# Patient Record
Sex: FEMALE | Race: OTHER | HISPANIC OR LATINO | Employment: OTHER | URBAN - METROPOLITAN AREA
[De-identification: names, ages, dates, MRNs, and addresses within clinical notes are randomized per-mention and may not be internally consistent; named-entity substitution may affect disease eponyms.]

---

## 2021-08-03 ENCOUNTER — TELEPHONE (OUTPATIENT)
Dept: GERIATRICS | Age: 71
End: 2021-08-03

## 2021-08-03 NOTE — TELEPHONE ENCOUNTER
[place] [address]  [phone]    Telephone Intake: Geriatric Assessment     Referral source: Laura Reaves who is scheduling/relationship to patient: Pastor De Leon, friend  Caller's phone number: [phone]              Is there a Power of  in place/If so, who? No, no    Reason for referral: Patient concerns  regarding memory concerns  What is the goal of visit? memory care     Has the patient been seen by a Neurologist or Geriatrician? No  Has the patient ever been diagnosed with dementia? No      Preferred language? [place] citizen  Highest education level? High school  Does the patient wear glasses? Yes   Does the patient use hearing aids? No     Does the patient and/or caregiver have access for a virtual visit (computer or smart phone, working audio and video)? Yes     Caller was informed:    Please make sure the patient is accompanied by someone who knows them well / a caregiver / family member to participate in this appointment  If a patient plans to attend the assessment alone, please route a message to the assigned provider   Primary number on file will be called to confirm this appointment  Who will accompany the patient? [address] [place] [address] packet mailed out to: [address]: If the patient was recently hospitalized, please route intake to assigned provider for chart review

## 2022-04-15 ENCOUNTER — CONSULT (OUTPATIENT)
Dept: GERIATRICS | Age: 72
End: 2022-04-15
Payer: COMMERCIAL

## 2022-04-15 VITALS
HEART RATE: 74 BPM | SYSTOLIC BLOOD PRESSURE: 120 MMHG | HEIGHT: 61 IN | TEMPERATURE: 97.8 F | OXYGEN SATURATION: 94 % | BODY MASS INDEX: 29.15 KG/M2 | WEIGHT: 154.4 LBS | DIASTOLIC BLOOD PRESSURE: 68 MMHG | RESPIRATION RATE: 16 BRPM

## 2022-04-15 DIAGNOSIS — E78.49 OTHER HYPERLIPIDEMIA: ICD-10-CM

## 2022-04-15 DIAGNOSIS — Z79.899 ENCOUNTER FOR MEDICATION REVIEW: ICD-10-CM

## 2022-04-15 DIAGNOSIS — I10 PRIMARY HYPERTENSION: ICD-10-CM

## 2022-04-15 DIAGNOSIS — F33.0 MILD EPISODE OF RECURRENT MAJOR DEPRESSIVE DISORDER (HCC): ICD-10-CM

## 2022-04-15 DIAGNOSIS — F03.91 DEMENTIA WITH BEHAVIORAL DISTURBANCE, UNSPECIFIED DEMENTIA TYPE (HCC): Primary | ICD-10-CM

## 2022-04-15 DIAGNOSIS — H40.9 GLAUCOMA, UNSPECIFIED GLAUCOMA TYPE, UNSPECIFIED LATERALITY: ICD-10-CM

## 2022-04-15 DIAGNOSIS — Z79.4 TYPE 2 DIABETES MELLITUS WITH DIABETIC POLYNEUROPATHY, WITH LONG-TERM CURRENT USE OF INSULIN (HCC): ICD-10-CM

## 2022-04-15 DIAGNOSIS — E11.42 TYPE 2 DIABETES MELLITUS WITH DIABETIC POLYNEUROPATHY, WITH LONG-TERM CURRENT USE OF INSULIN (HCC): ICD-10-CM

## 2022-04-15 PROBLEM — F03.90 DEMENTIA (HCC): Status: ACTIVE | Noted: 2022-04-15

## 2022-04-15 PROCEDURE — 99205 OFFICE O/P NEW HI 60 MIN: CPT | Performed by: STUDENT IN AN ORGANIZED HEALTH CARE EDUCATION/TRAINING PROGRAM

## 2022-04-15 RX ORDER — FLUOXETINE HYDROCHLORIDE 20 MG/1
20 CAPSULE ORAL DAILY
COMMUNITY
Start: 2022-04-13

## 2022-04-15 RX ORDER — METFORMIN HYDROCHLORIDE 750 MG/1
750 TABLET, EXTENDED RELEASE ORAL
COMMUNITY
Start: 2022-02-28 | End: 2023-02-28

## 2022-04-15 RX ORDER — LATANOPROST 50 UG/ML
SOLUTION/ DROPS OPHTHALMIC
COMMUNITY
Start: 2011-03-13

## 2022-04-15 RX ORDER — ATENOLOL 100 MG/1
100 TABLET ORAL
COMMUNITY
Start: 2011-07-10

## 2022-04-15 RX ORDER — CETIRIZINE 2.4 MG/ML
1 SOLUTION/ DROPS OPHTHALMIC
COMMUNITY
Start: 2022-04-04

## 2022-04-15 RX ORDER — ESCITALOPRAM OXALATE 10 MG/1
TABLET ORAL
COMMUNITY
Start: 2011-05-26

## 2022-04-15 RX ORDER — LATANOPROST 50 UG/ML
SOLUTION/ DROPS OPHTHALMIC
COMMUNITY
Start: 2011-07-24

## 2022-04-15 RX ORDER — HYDROXYZINE HYDROCHLORIDE 25 MG/1
25 TABLET, FILM COATED ORAL 3 TIMES DAILY
COMMUNITY
Start: 2022-04-13

## 2022-04-15 RX ORDER — SOLIFENACIN SUCCINATE 10 MG/1
TABLET, FILM COATED ORAL
COMMUNITY
Start: 2011-06-09

## 2022-04-15 RX ORDER — OLOPATADINE HYDROCHLORIDE 1 MG/ML
1 SOLUTION/ DROPS OPHTHALMIC 2 TIMES DAILY
COMMUNITY
Start: 2022-04-13 | End: 2023-04-13

## 2022-04-15 RX ORDER — ERGOCALCIFEROL (VITAMIN D2) 1250 MCG
1 CAPSULE ORAL WEEKLY
COMMUNITY
Start: 2022-04-13

## 2022-04-15 RX ORDER — POLYETHYLENE GLYCOL 400 AND PROPYLENE GLYCOL 4; 3 MG/ML; MG/ML
1 SOLUTION/ DROPS OPHTHALMIC 3 TIMES DAILY
COMMUNITY
Start: 2021-07-20 | End: 2022-07-20

## 2022-04-15 RX ORDER — SIMVASTATIN 10 MG
10 TABLET ORAL
COMMUNITY
Start: 2022-04-13

## 2022-04-15 RX ORDER — LISINOPRIL AND HYDROCHLOROTHIAZIDE 20; 12.5 MG/1; MG/1
1 TABLET ORAL DAILY
COMMUNITY
Start: 2022-04-13 | End: 2023-04-13

## 2022-04-15 RX ORDER — INSULIN GLARGINE 100 [IU]/ML
INJECTION, SOLUTION SUBCUTANEOUS
COMMUNITY
Start: 2022-02-28

## 2022-04-15 RX ORDER — AMMONIUM LACTATE 12 G/100G
1 CREAM TOPICAL
COMMUNITY
Start: 2021-12-06

## 2022-04-15 RX ORDER — MONTELUKAST SODIUM 10 MG/1
10 TABLET ORAL DAILY
COMMUNITY
Start: 2022-04-13

## 2022-04-15 RX ORDER — GLIMEPIRIDE 1 MG/1
1 TABLET ORAL DAILY
COMMUNITY
Start: 2022-04-13 | End: 2023-04-13

## 2022-04-15 RX ORDER — MIRABEGRON 50 MG/1
50 TABLET, FILM COATED, EXTENDED RELEASE ORAL DAILY
COMMUNITY
Start: 2022-04-14 | End: 2023-04-14

## 2022-04-15 RX ORDER — SAW/PYGEUM/BETA/HERB/D3/B6/ZN 30 MG-25MG
10 CAPSULE ORAL
COMMUNITY
Start: 2022-04-13 | End: 2023-04-13

## 2022-04-15 RX ORDER — SITAGLIPTIN AND METFORMIN HYDROCHLORIDE 1000; 50 MG/1; MG/1
TABLET, FILM COATED ORAL
COMMUNITY
Start: 2011-07-13

## 2022-04-15 RX ORDER — GABAPENTIN 300 MG/1
300 CAPSULE ORAL
COMMUNITY
Start: 2022-04-13 | End: 2023-04-13

## 2022-04-15 RX ORDER — MAGNESIUM OXIDE 400 MG/1
400 TABLET ORAL DAILY
COMMUNITY
Start: 2022-04-13 | End: 2023-04-13

## 2022-04-15 RX ORDER — AZELASTINE 1 MG/ML
1-2 SPRAY, METERED NASAL 2 TIMES DAILY
COMMUNITY
Start: 2021-12-06 | End: 2022-12-06

## 2022-04-15 NOTE — PROGRESS NOTES
ASSESSMENT AND PLAN:  1  Dementia with behavioral disturbance, unspecified dementia type ([facility] [address] )  Assessment & Plan:  MOCA 15/30, GDS 2, TUGT 11 sec  Patient significant deficits in visual spatial, executive function, attention, language, abstraction, delayed recall and orientation domains  Will order TSH, B12, folate, RPR  Will order MRI neuro quant of the brain  [facility] 2020:  No gross abnormality  Given history, physical exam and above neurocognitive screening, this places the patient a level mild dementia  Etiology likely multifactorial:  Vascular risk factors vs neuro degenerative process vs mood disorder  Will refer for cognitive therapy  Consider blister packaging for ease of medication administration  Patient maintained on Prozac and Lexapro by PCP  Would consider referral to Psychiatry given notable behaviors with dementia  Will discuss pharmacotherapy options at care plan conference as well as review of current medications as patient is maintained on hydroxyzine, solifenacin, gabapentin  Patient currently has 24/7 supervised care  Consider a falls Alert device as a safety precaution  Would encourage enrolling into a senior  program for positive socialization, physical and cognitive stimulation  Goal per family is to keep the patient at home  Consider cognitively stimulating apps  Consider the memory cafe  Recommend the Mediterranean diet shown to decrease risk of memory loss and CVA  Reorientation redirection as needed  Manage chronic conditions  Maintain Falls precautions  Encourage patient to remain active mentally, physically and socially  Participate in cognitively challenging exercises as able    Orders:  -     Ambulatory Referral to Psychiatry; Future  -     TSH, 3rd generation with Free T4 reflex; Future  -     Vitamin B12; Future  -     Folate; Future  -     RPR; Future; Expected date: 04/16/2022  -     MRI brain NeuroQuant wo contrast; Future; Expected date: 04/15/2022    2   Type 2 diabetes mellitus with diabetic polyneuropathy, with long-term current use of insulin (HCC)  Assessment & Plan:    Lab Results   Component Value Date    HGBA1C 6 6 (H) 02/21/2022   Patient continues to with endocrinology maintained on glimepiride, Januvia, Jardiance and Lantus  Recommend adherence to a diabetic, heart healthy diet  Exercise as able        3  Other hyperlipidemia  Assessment & Plan:  Patient maintained simvastatin 10 mg daily  Recommend a diabetic, heart healthy diet  Follow-up with PCP for continued monitoring      4  Glaucoma, unspecified glaucoma type, unspecified laterality  Assessment & Plan:  Follow-up with Ophthalmology  Continue latanoprost ophthalmic drops  Maintain Falls precautions      5  Primary hypertension  Assessment & Plan:  /68  Controlled  Patient continues on Prinzide Zestoretic, atenolol  Follow-up with PCP for continued monitoring  Recommend a diabetic, heart healthy diet      6  Mild episode of recurrent major depressive disorder [facility])  Assessment & Plan:  GDS 2  Patient with history chronic depression  Currently maintained on Prozac, Lexapro  Continue social support by family and friends  No HI/SI  Would recommend referral to Psychiatry due to notable behaviors with dementia      7   Encounter for medication review  Assessment & Plan:  Per family, patient currently maintained on solifenacin which may contribute to confusion, hallucinations and somnolence in older patients  Patient also maintained on gabapentin which may have the side effect of abnormal thinking, amnesia and dysarthria in older patients  Patient also chronically on Atarax which may contribute to ataxia, weakness, slurred speech in elderly patients  Discuss further at care plan conference        Decision-making capacity:  The patient should not make any medical or financial decisions independently without the presence of her POA as assessed during this visit    Staging:  Mild dementia, FAST stage 4    Medications Review:  Reviewed chronic use of gabapentin, atarax and solifenacin in older patients      HPI:    We had the pleasure of evaluating Vashti Desai who is a 66 y o  female with type 2 diabetes, HTN, HLD, CKD 3, polyneuropathy, depression, right adrenal nodule and cognitive impairment amongst other medical conditions in Geriatric consultation today  Ms Corinne Rhein is in the office with her nephew and her sister is present via the phone  The patient currently resides in a senior living apartment independently however she does have 24/7 supervised care with home health aides  Her nephew is one of her home health aides and typically spends most of the daytime period with her  Per patient's sister and nephew, the patient is independent in all ADLs but dependent in IADLs  [place] and completed middle school  She has not worked since coming to [place]  She does have 3 daughters, 2 reside in [address] [place] [address] and 1 in [place]  Today family explained that the patient has had progressively worsening memory loss over the past 5 years  Family feels short-term memory has been more affected than long-term memory  The patient continues to cook at home with supervision from her aides as she has often left the stove on and burnt food  Nephew who is 1 of her aides states that she continues to be forgetful about turning the stove off as a result of which she usually is supervised during the cooking process  Sister explains that she typically pays for the patient's apartment which cause $50  per month and family will supervise administration of her medications  The patient's mood is somewhat labile as her nephew explains she has become very short-tempered and argumentative at times  She does have a chronic history of depression for which she has been managed on Lexapro as well as Prozac    No aggressive behaviors however she does have intermittent agitation in the evenings if she becomes upset  The patient remains very sedentary at home and looks at television for most of the day  She does not enjoy participating in any cognitive or physical activities despite encouragement from her aides  No cardiorespiratory distress, fever, chills, URI or urinary symptoms  The patient continues to tolerate oral intake, has been sleeping on her current regimen of hydroxyzine and has been having routine bowel movements  During the office visit, the patient was very repetitive in required frequent reorientation redirection  She was pleasant and cooperative however and requested further workup as she continues to follow with multiple specialties for her chronic conditions  COGNITION:  Memory Issues noticed since 5 years  Memory affected: short and long term memory loss    Symptoms started: gradual  Over time the memory has:  worsened  Memory issue(s) were noted by: family   Patient has difficulties with medication errors and cooking  Difficulty finding the right word while speaking: Yes  Requires repeat information or asking the same question repeatedly: Yes  Fluctuation in alertness: No  Changes in mood or personality:No  Current or previous treatment for depression or anxiety: Yes    Family member with dementia and what type? yes  History of head trauma: unknown  History of stroke: No  History of alcohol or substance abuse: No    FUNCTIONAL STATUS:  BADLs  Does patient require assistance with:  Bathing: No  Dressing: No  Toileting: No  Transferring: No  Continence: No  Feeding: No    IADLs  Dose patient require assistance with:  Telephone: Yes  Shopping: Yes  Food Preparation: Yes  Housekeeping: Yes  Laundry: Yes  Transportation: Yes  Medications: Yes  Finances: Yes    NEUROPSYCH SYMPTOMS:  Does patient get angry or hostile? Resist care from others? Yes  Does patient see or hear things that no one else can see or hear? No  Does patient act impatient and cranky?  Does mood frequently change for no apparent reason? Yes  Does patient act suspicious without good reason (example: believes that others are stealing from him or her, or planning to harm him or her in some way)? Yes  Does patient less interested in his or her usual activities or in the activities and plans of others? Yes  Does patient have trouble sleeping at night? No  Dose patient have abnormal movements while asleep? No    SAFETY:  Hearing and vision issue: No  Any gait or balance disorder: Yes  Uses: none  Any falls in the last year: Yes  Any history of wandering: No  Are there firearms or guns in the home: No  Does patient drive: No  Any driving accidents or citations in the last year: No  Any concerns about patient's ability to drive: No    ACP REVIEW:  Does patient have POA: Yes  Does patient have a Living will: Yes  Any legal assistance needed for healthcare planning?: No    ROS: Review of Systems   Constitutional: Negative for chills and fever  HENT: Negative for congestion, ear pain, rhinorrhea and sore throat  Eyes: Negative for discharge  Respiratory: Negative for cough, chest tightness, shortness of breath, wheezing and stridor  Cardiovascular: Negative for chest pain, palpitations and leg swelling  Gastrointestinal: Negative for abdominal pain, constipation, diarrhea, nausea and vomiting  Genitourinary: Positive for frequency (Chronic) and urgency (Chronic)  Negative for dysuria  Musculoskeletal: Positive for arthralgias  Negative for back pain and gait problem  Skin: Negative for color change, pallor, rash and wound  Neurological: Negative for dizziness, speech difficulty and light-headedness  Psychiatric/Behavioral: Positive for confusion (intermittent) and decreased concentration  Negative for dysphoric mood and sleep disturbance         Allergies   Allergen Reactions    Aspirin Other (See Comments) and Shortness Of Breath     eye, throat problems    Diphenhydramine Shortness Of Breath    Shellfish Allergy - Food Allergy Shortness Of Breath    Ibuprofen Other (See Comments)     swelling, itchy eyes       Medications:    Current Outpatient Medications on File Prior to Visit   Medication Sig Dispense Refill    ammonium lactate (LAC-HYDRIN) 12 % cream Apply 1 application topically      atenolol (TENORMIN) 100 mg tablet Take 100 mg by mouth      azelastine (ASTELIN) 0 1 % nasal spray 1-2 sprays into each nostril 2 (two) times a day      Cetirizine HCl (Zerviate) 0 24 % SOLN Apply 1 drop to eye      cyanocobalamin (VITAMIN B-12) 1000 MCG tablet Take 1,000 mcg by mouth daily      ergocalciferol (ERGOCALCIFEROL) 1 25 MG (41614 UT) capsule Take 1 capsule by mouth once a week      escitalopram (Lexapro) 10 mg tablet Take by mouth      FLUoxetine (PROzac) 20 mg capsule Take 20 mg by mouth daily      gabapentin (NEURONTIN) 300 mg capsule Take 300 mg by mouth      glimepiride (AMARYL) 1 mg tablet Take 1 mg by mouth daily      glucose blood test strip Use Three times a day      hydrOXYzine HCL (ATARAX) 25 mg tablet Take 25 mg by mouth Three times a day      insulin glargine (Lantus SoloStar) 100 units/mL injection pen Inject 18u sc daily      Insulin Pen Needle 32G X 6 MM MISC Use qith solostar pen daily      latanoprost (XALATAN) 0 005 % ophthalmic solution Apply to eye      latanoprost (Xalatan) 0 005 % ophthalmic solution Apply to eye      lisinopril-hydrochlorothiazide (PRINZIDE,ZESTORETIC) 20-12 5 MG per tablet Take 1 tablet by mouth daily      magnesium oxide (MAG-OX) 400 mg tablet Take 400 mg by mouth daily      Melatonin ER 10 MG TBCR Take 10 mg by mouth      metFORMIN (GLUCOPHAGE-XR) 750 mg 24 hr tablet Take 750 mg by mouth      Mirabegron ER (Myrbetriq) 50 MG TB24 Take 50 mg by mouth daily      montelukast (SINGULAIR) 10 mg tablet Take 10 mg by mouth daily      olopatadine (PATANOL) 0 1 % ophthalmic solution Apply 1 drop to eye 2 (two) times a day      polyethylene glycol-propylene glycol (Systane) 0 4-0 3 % Apply 1 drop to eye Three times a day      simvastatin (ZOCOR) 10 mg tablet Take 10 mg by mouth      sitaGLIPtin-metFORMIN (Janumet)  MG per tablet Take by mouth      solifenacin (VESIcare) 10 MG tablet Take by mouth       No current facility-administered medications on file prior to visit  History:  History reviewed  No pertinent past medical history  History reviewed  No pertinent surgical history  History reviewed  No pertinent family history  Social History     Socioeconomic History    Marital status:      Spouse name: Not on file    Number of children: Not on file    Years of education: Not on file    Highest education level: Not on file   Occupational History    Not on file   Tobacco Use    Smoking status: Not on file    Smokeless tobacco: Not on file   Substance and Sexual Activity    Alcohol use: Not on file    Drug use: Not on file    Sexual activity: Not on file   Other Topics Concern    Not on file   Social History Narrative    Not on file     Social Determinants of Health     Financial Resource Strain: Not on file   Food Insecurity: Not on file   Transportation Needs: Not on file   Physical Activity: Not on file   Stress: Not on file   Social Connections: Not on file   Intimate Partner Violence: Not on file   Housing Stability: Not on file     History reviewed  No pertinent surgical history  OBJECTIVE:  Vitals:    04/15/22 1548   BP: 120/68   BP Location: Left arm   Patient Position: Sitting   Cuff Size: Adult   Pulse: 74   Resp: 16   Temp: 97 8 °F (36 6 °C)   TempSrc: Temporal   SpO2: 94%   Weight: 70 kg (154 lb 6 4 oz)   Height: 5' 0 5" (1 537 m)     Body mass index is 29 66 kg/m²  Physical Exam  Vitals reviewed  Constitutional:       General: She is not in acute distress  Appearance: Normal appearance  She is well-developed  She is not ill-appearing or diaphoretic     HENT:      Head: Normocephalic and atraumatic  Right Ear: Tympanic membrane, ear canal and external ear normal       Left Ear: Tympanic membrane, ear canal and external ear normal       Nose: Nose normal       Mouth/Throat:      Mouth: Mucous membranes are moist       Pharynx: No oropharyngeal exudate  Eyes:      General: No scleral icterus  Right eye: No discharge  Left eye: No discharge  Conjunctiva/sclera: Conjunctivae normal    Neck:      Vascular: No JVD  Cardiovascular:      Rate and Rhythm: Normal rate and regular rhythm  Heart sounds: Murmur heard  No friction rub  No gallop  Pulmonary:      Effort: Pulmonary effort is normal  No respiratory distress  Breath sounds: Normal breath sounds  No wheezing or rales  Abdominal:      General: Bowel sounds are normal  There is no distension  Palpations: Abdomen is soft  Tenderness: There is no abdominal tenderness  There is no guarding  Musculoskeletal:         General: No tenderness or deformity  Normal range of motion  Cervical back: Normal range of motion and neck supple  Right lower leg: No edema  Left lower leg: No edema  Skin:     General: Skin is warm  Coloration: Skin is not pale  Findings: No erythema or rash  Neurological:      General: No focal deficit present  Mental Status: She is alert and oriented to person, place, and time  Mental status is at baseline  Cranial Nerves: No cranial nerve deficit  Gait: Gait normal       Deep Tendon Reflexes: Reflexes are normal and symmetric     Psychiatric:         Mood and Affect: Mood normal          MoCA: 15/30      Labs & Imaging:  Lab Results   Component Value Date    WBC 9 10 04/19/2014    HGB 11 5 04/19/2014    HCT 34 5 (L) 04/19/2014    MCV 95 04/19/2014     04/19/2014     Lab Results   Component Value Date    K 4 4 04/19/2014     (L) 04/19/2014    CO2 27 04/19/2014    BUN 20 04/19/2014    CREATININE 0 85 04/19/2014    CALCIUM 9 4 04/19/2014     No results found for: [id number]  No results found for: [id number], TSH  No results found for: [id number], [id number]   No results found for this or any previous visit

## 2022-04-17 PROBLEM — Z79.899 ENCOUNTER FOR MEDICATION REVIEW: Status: ACTIVE | Noted: 2022-04-17

## 2022-04-17 PROBLEM — Z79.4 TYPE 2 DIABETES MELLITUS, WITH LONG-TERM CURRENT USE OF INSULIN (HCC): Status: ACTIVE | Noted: 2022-04-17

## 2022-04-17 PROBLEM — H40.9 GLAUCOMA: Status: ACTIVE | Noted: 2022-04-17

## 2022-04-17 PROBLEM — E11.9 TYPE 2 DIABETES MELLITUS, WITH LONG-TERM CURRENT USE OF INSULIN (HCC): Status: ACTIVE | Noted: 2022-04-17

## 2022-04-17 PROBLEM — F33.0 MILD EPISODE OF RECURRENT MAJOR DEPRESSIVE DISORDER (HCC): Status: ACTIVE | Noted: 2022-04-17

## 2022-04-17 PROBLEM — E78.49 OTHER HYPERLIPIDEMIA: Status: ACTIVE | Noted: 2022-04-17

## 2022-04-17 PROBLEM — I10 PRIMARY HYPERTENSION: Status: ACTIVE | Noted: 2022-04-17

## 2022-04-17 NOTE — ASSESSMENT & PLAN NOTE
MOCA 15/30, GDS 2, TUGT 11 sec  Patient significant deficits in visual spatial, executive function, attention, language, abstraction, delayed recall and orientation domains  Will order TSH, B12, folate, RPR  Will order MRI neuro quant of the brain  [facility] 2020:  No gross abnormality  Given history, physical exam and above neurocognitive screening, this places the patient a level mild dementia  Etiology likely multifactorial:  Vascular risk factors vs neuro degenerative process vs mood disorder  Will refer for cognitive therapy  Consider blister packaging for ease of medication administration  Patient maintained on Prozac and Lexapro by PCP  Would consider referral to Psychiatry given notable behaviors with dementia  Will discuss pharmacotherapy options at care plan conference as well as review of current medications as patient is maintained on hydroxyzine, solifenacin, gabapentin  Patient currently has 24/7 supervised care  Consider a falls Alert device as a safety precaution  Would encourage enrolling into a senior  program for positive socialization, physical and cognitive stimulation  Goal per family is to keep the patient at home  Consider cognitively stimulating apps  Consider the memory cafe  Recommend the Mediterranean diet shown to decrease risk of memory loss and CVA  Reorientation redirection as needed  Manage chronic conditions  Maintain Falls precautions  Encourage patient to remain active mentally, physically and socially  Participate in cognitively challenging exercises as able

## 2022-04-17 NOTE — ASSESSMENT & PLAN NOTE
Per family, patient currently maintained on solifenacin which may contribute to confusion, hallucinations and somnolence in older patients  Patient also maintained on gabapentin which may have the side effect of abnormal thinking, amnesia and dysarthria in older patients  Patient also chronically on Atarax which may contribute to ataxia, weakness, slurred speech in elderly patients  Discuss further at care plan conference

## 2022-04-17 NOTE — ASSESSMENT & PLAN NOTE
Lab Results   Component Value Date    HGBA1C 6 6 (H) 02/21/2022   Patient continues to with endocrinology maintained on glimepiride, Januvia, Jardiance and Lantus  Recommend adherence to a diabetic, heart healthy diet  Exercise as able

## 2022-04-17 NOTE — ASSESSMENT & PLAN NOTE
Patient maintained simvastatin 10 mg daily  Recommend a diabetic, heart healthy diet  Follow-up with PCP for continued monitoring

## 2022-04-17 NOTE — ASSESSMENT & PLAN NOTE
/68  Controlled  Patient continues on Prinzide Zestoretic, atenolol  Follow-up with PCP for continued monitoring  Recommend a diabetic, heart healthy diet

## 2022-04-21 ENCOUNTER — TELEPHONE (OUTPATIENT)
Dept: GERIATRICS | Age: 72
End: 2022-04-21

## 2022-04-21 NOTE — TELEPHONE ENCOUNTER
Called pt daughter and left message to give our office a call back in regards to bloodwork that needs to be completed in order for the pt MRI to get approved  Quality 137: Melanoma: Continuity Of Care - Recall System: Patient information entered into a recall system that includes: target date for the next exam specified AND a process to follow up with patients regarding missed or unscheduled appointments Quality 226: Preventive Care And Screening: Tobacco Use: Screening And Cessation Intervention: Patient screened for tobacco use and is an ex/non-smoker Quality 128: Preventive Care And Screening: Body Mass Index (Bmi) Screening And Follow-Up Plan: BMI is documented within normal parameters and no follow-up plan is required. Detail Level: Detailed Quality 110: Preventive Care And Screening: Influenza Immunization: Influenza Immunization previously received during influenza season

## 2022-04-21 NOTE — TELEPHONE ENCOUNTER
Called pt vaughn in regards to having the pt complete blood work as I need the results to send over to RAD MD in order for Authorization to be completed for upcoming MRI

## 2022-04-28 NOTE — TELEPHONE ENCOUNTER
Called patient's friend, Phillipmolina Caalex [phone], As listed on patient's intake form and identified as patient contact who scheduled geriatric assessment for patient  Relayed patient needs labs to be completed and relayed the imperative nature of having blood work completed asap  Noe Martínez expressed understanding and will make certain that patient has this completed asap

## 2022-05-02 NOTE — TELEPHONE ENCOUNTER
Called pt's nephew Na Lyle and relayed that pt has an MRI on Sunday 5/8 and prior Sage Victor is required  Told nephew to please have the pt get the bloodwork done as soon as possible and to let us know when the patient has completed

## 2022-05-08 ENCOUNTER — HOSPITAL ENCOUNTER (OUTPATIENT)
Dept: MRI IMAGING | Facility: HOSPITAL | Age: 72
Discharge: HOME | End: 2022-05-08
Attending: STUDENT IN AN ORGANIZED HEALTH CARE EDUCATION/TRAINING PROGRAM
Payer: COMMERCIAL

## 2022-05-08 DIAGNOSIS — F03.91 DEMENTIA WITH BEHAVIORAL DISTURBANCE, UNSPECIFIED DEMENTIA TYPE (HCC): ICD-10-CM

## 2022-05-08 PROCEDURE — 70551 MRI BRAIN STEM W/O DYE: CPT

## 2022-05-08 PROCEDURE — G1004 CDSM NDSC: HCPCS

## 2022-05-18 NOTE — PROGRESS NOTES
[facility]  [address] [facility], [address] [facility] [address] [facility], [address] [facility]  [phone]    Care Conference    NAME: Elissa Cotton  AGE: 66 y o  SEX: female  YOB: 1950  DATE OF ASSESSMENT: 04/15/22  DATE OF CONFERENCE: 05/20/22    Family Present: Maryjo Encinas (daughter)  Staff Present: Dr Brandi Pritchard, JAM Vann    Patient / Family Goals of Care: Review cognitive decline and associated symptoms, discuss treatment options and care planning  Medical Concerns (Current/Historical):  Type 2 diabetes mellitus with diabetic polyneuropathy, with long-term current use of insulin, Other hyperlipidemia, primary hypertension, mild episode of recurrent major depressive disorder, encounter for medication review     Geriatric Syndromes/Age Related Syndromes: Dementia with behavioral disturbance, unspecified dementia type, Glaucoma, unspecified glaucoma type, unspecified laterality    Neuropsychological   Dementia with behavioral disturbance   Patient significant deficits in visual spatial, executive function, attention, language, abstraction, delayed recall an orientation domains   Given history, physical exam and above neurocognitive screening, this places the patient a level of mild- moderate dementia, etiology likely mixed: Vascular risk factors  vs  Mood disorder   Decision-making capacity: The patient should not make any medical or financial decisions independently without the presence of her POA as assessed during this visit   Staging: Mild-Moderate dementia, FAST stage 4  o Rodney Cognitive Assessment: 15/30  o Geriatric Depression Screen: 2/15     Remain active physically, mentally and socially   Referral for Cognitive Therapy at [facility] with [place] therapist  Michael Martinez and non-pharmaceutical interventions discussed, will defer at this time due to side effect profile   Recommend Mediterranean diet, shown to decrease risk of memory loss and CVA   Engage in cognitively challenging exercises such as crosswords and puzzles   Consider use of apps such as [employer]  com, [employer], [employer] for cognitively stimulating online games   Maintain chronic conditions under control   The patient does meet criteria for placement into a higher level of care  Per family, goal is to remain at home with increased supervision already in place   MSW to provide resources if they wish   Repeat cognitive assessment as needed    Diagnostic Studies   Review of bloodwork: TSH, B12, Folate, RPR   Review of MRI NeuroQuant (22) IMPRESSION: White matter changes suggestive of mild, chronic micoangiopathy  No acute infarction, intracranial hemorrhage or mass effect  NeuroQuant analysis was performed: Normal study; does not support neurodegeneration   Previous imagin [facility] 2020: No gross abnormality        Physical Finding Impacting Function   Timed Up and Go Test: 11 seconds (Fall risk assessment: low)   Activities of Daily Living: Independent   Instrumental Activities of Daily Living: Dependent     Encourage appropriate footwear at all times   Review fall risk prevention tips and adjust within the home environment as needed    Medications Reviewed    Will discuss pharmacotherapy options & review current medications-chronic use of gabapentin, atarax and solifenacin in older patients   Check with PCP before using over the counter medications   Avoid over the counter medications that can affect cognition (e g , Benadryl, Tylenol PM)    Avoid NSAIDs due to risk of GI bleed and renal impairment    Other Findings   Overall health   BMI: 29 66 kg/m2   Recommend adherence to diabetic, heart healthy diet   Continue following with primary care physician regularly  Type 2 diabetes mellitus with diabetic polyneuropathy, with long-term current use of insulin   HGBA1C 6 6 (H) 22   Continue following with endocrinology   Maintained on glimepiride, Januvia, Jardiance, and Lantus   Exercise as able  Other hyperlipidemia   Patient maintained on simvastatin 10 mg daily   Follow-up with PCP for continued monitoring  Glaucoma, unspecified glaucoma type, unspecified laterality   Follow-up with Ophthalmology   Continue latanoprost ophthalmic drops   Maintain Fall Precautions  Primary hypertension   Controlled   Patient continues on Prinzide Zestoretic, atenolol   Follow-up with PCP for continued monitoring  Mild episode of recurrent major depressive disorder   GDS 2   Patient with a history of chronic depression   Currently maintained on Prozac, Lexapro   Continue social support by family and friends   Referred to Psychiatry due to notable behaviors with dementia  Encounter for medication review   Per family, patient currently maintained on solifenacin   Educated family this may contribute to confusion, hallucinations and somnolence in older patients   Patient also maintained on gabapentin which may have a side effect of abnormal thinking, amnesia, and dysarthria in older patients   Patient also chronically on atarax which may contribute to ataxia, weakness, slurred speech in elderly patients      Recommended Health Maintenance   Immunizations, if not contraindicated:    Influenza vaccine yearly   Pneumo vaccine every 5 years (72 years and over)   Shingles vaccine   COVID-19 vaccine    Social / Safety Concerns   Consider an Adult Day Program for positive socialization, physical exercise, cognitive stimulation and family respite   Consider attending the Memory Cafe   Stay in touch with family and friends   Plan self-care activities for your mental well-being each week   Consider volunteering through [employer] ([phone]) or Volunteer [employer]   Recommend review of fall risk prevention tips   Recommend use of fall precautions including fall alert device   Consider assistance for medication administration such as blister packaging or use of an automated pill dispenser   Recommend contacting your local [address] [facility] on Aging for possible eligible programs such as OPTIONS, Caregiver Support Program, or [facility] Maintain updated advance directives and provide a copy to your primary care provider   Consider caregiver support groups and educational resources through the Alzheimer's Association; access Alzheimer's Association 24/7 Helpline at 5-850.415.7030  ? [address] does offer a monthly caregiver support group  If interested, please speak with a    Utilize reorientation and redirection as needed (dependent on situation)   Educational information provided    Patient and family verbalized understanding of above care plan  For care coordination purposes, this care plan will be shared with your primary care provider  With any questions, please contact our office at [phone]

## 2022-05-20 ENCOUNTER — TELEPHONE (OUTPATIENT)
Dept: PSYCHIATRY | Facility: CLINIC | Age: 72
End: 2022-05-20

## 2022-05-20 ENCOUNTER — TELEMEDICINE (OUTPATIENT)
Dept: GERIATRICS | Age: 72
End: 2022-05-20
Payer: COMMERCIAL

## 2022-05-20 DIAGNOSIS — I10 PRIMARY HYPERTENSION: ICD-10-CM

## 2022-05-20 DIAGNOSIS — F03.91 DEMENTIA WITH BEHAVIORAL DISTURBANCE, UNSPECIFIED DEMENTIA TYPE (HCC): Primary | ICD-10-CM

## 2022-05-20 DIAGNOSIS — F33.0 MILD EPISODE OF RECURRENT MAJOR DEPRESSIVE DISORDER (HCC): ICD-10-CM

## 2022-05-20 DIAGNOSIS — E11.42 TYPE 2 DIABETES MELLITUS WITH DIABETIC POLYNEUROPATHY, WITH LONG-TERM CURRENT USE OF INSULIN (HCC): ICD-10-CM

## 2022-05-20 DIAGNOSIS — Z79.4 TYPE 2 DIABETES MELLITUS WITH DIABETIC POLYNEUROPATHY, WITH LONG-TERM CURRENT USE OF INSULIN (HCC): ICD-10-CM

## 2022-05-20 DIAGNOSIS — E78.49 OTHER HYPERLIPIDEMIA: ICD-10-CM

## 2022-05-20 PROCEDURE — 99483 ASSMT & CARE PLN PT COG IMP: CPT | Performed by: STUDENT IN AN ORGANIZED HEALTH CARE EDUCATION/TRAINING PROGRAM

## 2022-05-20 NOTE — TELEPHONE ENCOUNTER
Pt called back  Pt does not recall being referred to [facility]iatric [facility]  Pt also stated that she already has  Therapist/ med mgmt services  Referral did state dementia

## 2022-05-20 NOTE — PROGRESS NOTES
Virtual Regular Visit    Verification of patient location:    Patient is located in the following state in which I hold an active license [place]      Assessment/Plan:    Problem List Items Addressed This Visit        Endocrine    Type 2 diabetes mellitus, with long-term current use of insulin ([facility] )       Cardiovascular and Mediastinum    Primary hypertension       Nervous and Auditory    Dementia ([facility] ) - Primary    Relevant Orders    Ambulatory Referral to Speech Therapy       Other    Other hyperlipidemia    Mild episode of recurrent major depressive disorder ([facility] )        See care plan note for assessment and plan       Reason for visit is   Chief Complaint   Patient presents with   [address]    Virtual Regular Visit        Encounter provider Juwan Parsons MD    Provider located at [address] [place] [address] [address] [place] [address]  [phone]      Recent Visits  Date Type Provider Dept   05/20/22 Cain Fleischer, MD [address] [facility] recent visits within past 7 days and meeting all other requirements  Future Appointments  No visits were found meeting these conditions  Showing future appointments within next 150 days and meeting all other requirements       The patient was identified by name and date of birth  Dayana Harp was informed that this is a telemedicine visit and that the visit is being conducted through [employer] and patient was informed that this is a secure, HIPAA-compliant platform  She agrees to proceed     My office door was closed  No one else was in the room  She acknowledged consent and understanding of privacy and security of the video platform  The patient has agreed to participate and understands they can discontinue the visit at any time  Patient is aware this is a billable service       Subjective  Dayana Harp is a 66 y o  female who presents for her care plan conference Juan Luis Canas HPI     Patient presents for her care plan conference after recent comprehensive geriatric assessment  She is accompanied by her daughter Mer Mayo  Since prior geriatric intake, no acute changes in cognition, mood, personality or behaviors  Nor  Have there been any safety concerns at home  Per daughter, the patient continues to tolerate oral intake, has been sleeping well and having regular bowel movements  No cardiorespiratory distress, fever, chills, URI, urinary symptoms or recent falls  Of note patient's daughter who was present during the visit today seemed unaware of the history obtained at intake  Attempted to reach patient's sister Erlinda Chavira for further clarification however she was not available per Mer Mayo        Decision-making capacity:  The patient should not make any medical or financial decisions independently without the presence of her POA as assessed during this visit  Staging:  Mild- Moderate dementia, FAST stage 4    Medications Review:  Previously discussed in detail chronic use of gabapentin, Atarax and solifenacin in all the patient's and counseled on side effects  COGNITION:  Memory Issues noticed since 5 years    Memory affected: short term memory loss and long term memory loss    Symptoms started: gradual  Over time the memory has:  worsened  Memory issue(s) were noted by: family   Patient has difficulties with medication errors and cooking  Difficulty finding the right word while speaking: Yes  Requires repeat information or asking the same question repeatedly: Yes  Fluctuation in alertness: No  Changes in mood or personality:No  Current or previous treatment for depression or anxiety: Yes    Family member with dementia and what type?  yes  History of head trauma: unknown  History of stroke: No  History of alcohol or substance abuse: No    FUNCTIONAL STATUS:  BADLs  Does patient require assistance with:  Bathing: No  Dressing: No  Toileting: No  Transferring: No  Continence: No  Feeding: No    IADLs  Dose patient require assistance with:  Telephone: Yes  Shopping: Yes  Food Preparation: Yes  Housekeeping: Yes  Laundry: Yes  Transportation: Yes  Medications: Yes  Finances: Yes    NEUROPSYCH SYMPTOMS:  Does patient get angry or hostile? Resist care from others? Yes  Does patient see or hear things that no one else can see or hear? No  Does patient act impatient and cranky? Does mood frequently change for no apparent reason? Yes  Does patient act suspicious without good reason (example: believes that others are stealing from him or her, or planning to harm him or her in some way)? Yes  Does patient less interested in his or her usual activities or in the activities and plans of others? Yes  Does patient have trouble sleeping at night? No  Dose patient have abnormal movements while asleep? No    SAFETY:  Hearing and vision issue: No  Any gait or balance disorder: Yes  Uses: none  Any falls in the last year: Yes  Any history of wandering: No  Are there firearms or guns in the home: No  Does patient drive: No  Any driving accidents or citations in the last year: No  Any concerns about patient's ability to drive: No    ACP REVIEW:  Does patient have POA: Yes  Does patient have a Living will: Yes  Any legal assistance needed for healthcare planning?: No        Past Medical History:   Diagnosis Date    Dementia ([address] )     Diabetes mellitus ([facility] )     Heart murmur     Hypertension        History reviewed  No pertinent surgical history      Current Outpatient Medications   Medication Sig Dispense Refill    ammonium lactate (LAC-HYDRIN) 12 % cream Apply 1 application topically      atenolol (TENORMIN) 100 mg tablet Take 100 mg by mouth      azelastine (ASTELIN) 0 1 % nasal spray 1-2 sprays into each nostril 2 (two) times a day      Cetirizine HCl (Zerviate) 0 24 % SOLN Apply 1 drop to eye      cyanocobalamin (VITAMIN B-12) 1000 MCG tablet Take 1,000 mcg by mouth daily      ergocalciferol (ERGOCALCIFEROL) 1 25 MG (18393 UT) capsule Take 1 capsule by mouth once a week      escitalopram (LEXAPRO) 10 mg tablet Take by mouth      FLUoxetine (PROzac) 20 mg capsule Take 20 mg by mouth daily      gabapentin (NEURONTIN) 300 mg capsule Take 300 mg by mouth      glimepiride (AMARYL) 1 mg tablet Take 1 mg by mouth daily      glucose blood test strip Use Three times a day      hydrOXYzine HCL (ATARAX) 25 mg tablet Take 25 mg by mouth Three times a day      insulin glargine (Lantus SoloStar) 100 units/mL injection pen Inject 18u sc daily      Insulin Pen Needle 32G X 6 MM MISC Use qith solostar pen daily      latanoprost (XALATAN) 0 005 % ophthalmic solution Apply to eye      latanoprost (XALATAN) 0 005 % ophthalmic solution Apply to eye      lisinopril-hydrochlorothiazide (PRINZIDE,ZESTORETIC) 20-12 5 MG per tablet Take 1 tablet by mouth daily      magnesium oxide (MAG-OX) 400 mg tablet Take 400 mg by mouth daily      Melatonin ER 10 MG TBCR Take 10 mg by mouth      metFORMIN (GLUCOPHAGE-XR) 750 mg 24 hr tablet Take 750 mg by mouth      Mirabegron ER (Myrbetriq) 50 MG TB24 Take 50 mg by mouth daily      montelukast (SINGULAIR) 10 mg tablet Take 10 mg by mouth daily      olopatadine (PATANOL) 0 1 % ophthalmic solution Apply 1 drop to eye 2 (two) times a day      polyethylene glycol-propylene glycol (Systane) 0 4-0 3 % Apply 1 drop to eye Three times a day      simvastatin (ZOCOR) 10 mg tablet Take 10 mg by mouth      sitaGLIPtin-metFORMIN (Janumet)  MG per tablet Take by mouth      solifenacin (VESICARE) 10 MG tablet Take by mouth       No current facility-administered medications for this visit          Allergies   Allergen Reactions    Aspirin Other (See Comments) and Shortness Of Breath     eye, throat problems    Diphenhydramine Shortness Of Breath    Shellfish Allergy - Food Allergy Shortness Of Breath    Ibuprofen Other (See Comments)     swelling, itchy eyes Review of Systems   Constitutional: Negative for chills and fever  HENT: Negative for congestion, ear pain, rhinorrhea and sore throat  Eyes: Negative for discharge  Respiratory: Negative for cough, chest tightness, shortness of breath, wheezing and stridor  Cardiovascular: Negative for chest pain, palpitations and leg swelling  Gastrointestinal: Negative for abdominal pain, constipation, diarrhea, nausea and vomiting  Genitourinary: Positive for frequency (chronic) and urgency (chronic)  Negative for dysuria  Musculoskeletal: Positive for arthralgias  Skin: Negative for color change, pallor, rash and wound  Neurological: Negative for dizziness  Psychiatric/Behavioral: Positive for confusion (intermittent) and decreased concentration  Negative for dysphoric mood and sleep disturbance  Video Exam    There were no vitals filed for this visit  Physical Exam  Constitutional:       General: She is not in acute distress  Appearance: Normal appearance  She is not ill-appearing or diaphoretic  HENT:      Head: Normocephalic and atraumatic  Right Ear: External ear normal       Left Ear: External ear normal       Nose: Nose normal       Mouth/Throat:      Mouth: Mucous membranes are moist    Eyes:      General:         Right eye: No discharge  Left eye: No discharge  Conjunctiva/sclera: Conjunctivae normal    Pulmonary:      Effort: Pulmonary effort is normal  No respiratory distress  Abdominal:      General: There is no distension  Palpations: Abdomen is soft  Tenderness: There is no abdominal tenderness  Musculoskeletal:         General: Normal range of motion  Cervical back: Normal range of motion  Skin:     General: Skin is dry  Neurological:      General: No focal deficit present  Mental Status: She is alert  Mental status is at baseline     Psychiatric:         Mood and Affect: Mood normal           MOCA 15/30    LABS/IMAGING    (4/28/2022)  B12: 578  TSH:0 46  Folate: 8  CBC (02/21/2022): WBC 5 7, HB 10 9, HCT 32 3,     MRI BRAIN WITHOUT CONTRAST, NeuroQuant IMAGING     INDICATION: F03 [id number]: Unspecified dementia with behavioral disturbance  Memory loss      COMPARISON:   2/19/2010     TECHNIQUE:  Sagittal T1, axial T2, axial [place], axial T1, axial FLAIR, axial diffusion imaging  Coronal T2  Sagittal 3D volumetric imaging processed by Chastity jorge General Morphology and Age Related Atrophy reports         IMAGE QUALITY:  Diagnostic      FINDINGS:     BRAIN PARENCHYMA:  There is no discrete mass, mass effect or midline shift  Brainstem and cerebellum demonstrate normal signal  There is no intracranial hemorrhage  There is no evidence of acute infarction and diffusion imaging is unremarkable   Small   scattered hyperintensities on T2/FLAIR imaging are noted in the periventricular and subcortical white matter demonstrating an appearance that is statistically most likely to represent mild microangiopathic change      QUANTITATIVE:      Exam Quality: Adequate for volumetric analysis      Hippocampus  Hippocampal Occupancy Score (HOC):                   0 76        Percentile for similar age:                              72     Total hippocampal volume (cc):                           6 46    Percentile for similar age:                              76     Entorhinal cortex (cc)                                            3 51      Percentile for similar age:                                   6                Superior Lateral ventricular volume (cc):             35 49    Percentile for similar age:                             61     Inferior lateral ventricular volume (cc):                    2 08    Percentile for similar age:                                37     Quantitative conclusions:        Hippocampal Volume:                       Normal volume        Entorhinal Volume: Normal volume        Superior Lateral Ventricle Volume:     Normal Volume       Inferior Lateral Ventricle Volume:       Normal Volume     Concordance between qualitative and quantitative hippocampal volume assessment: Concordant       Change in brain volumes: No previous volumetric study for comparison     Mean hippocampal volume loss among normal elderly: 0 7% per year, (-0 3 to 1 7;  Tiffanie 2008; also Mickey 2010)  VENTRICLES:  The ventricles are normal in size and contour      SELLA AND PITUITARY GLAND:  Normal      ORBITS:  Bilateral lens implants noted     PARANASAL SINUSES:  Normal      VASCULATURE:  Evaluation of the major intracranial vasculature demonstrates appropriate flow voids      CALVARIUM AND SKULL BASE:  Normal      EXTRACRANIAL SOFT TISSUES:  Normal            IMPRESSION:     1  White matter changes suggestive of mild, chronic microangiopathy  2   No acute infarction, intracranial hemorrhage or mass effect  3  NeuroQuant analysis was performed: Normal study; Does not support neurodegeneration          I spent 40 minutes directly with the patient during this visit (video/phone)    VIRTUAL VISIT DISCLAIMER      Patricia Lambert verbally agrees to participate in [employer]  Pt is aware that [employer] could be limited without vital signs or the ability to perform a full hands-on physical Rita Honey understands she or the provider may request at any time to terminate the video visit and request the patient to seek care or treatment in person

## 2022-06-17 ENCOUNTER — TELEPHONE (OUTPATIENT)
Dept: PSYCHIATRY | Facility: CLINIC | Age: 72
End: 2022-06-17

## 2023-03-14 ENCOUNTER — TELEPHONE (OUTPATIENT)
Dept: FAMILY MEDICINE CLINIC | Facility: CLINIC | Age: 73
End: 2023-03-14

## 2023-03-14 NOTE — TELEPHONE ENCOUNTER
Received voicemail - Ranjit  Abro a Angelique Galeana Prime mi teléfono es [address]  [address]  [facility] tony isatu con la doctora Anu Valiente  [place] por favor        Appointment already scheduled

## 2023-03-24 ENCOUNTER — OFFICE VISIT (OUTPATIENT)
Dept: FAMILY MEDICINE CLINIC | Facility: CLINIC | Age: 73
End: 2023-03-24

## 2023-03-24 VITALS
WEIGHT: 154 LBS | SYSTOLIC BLOOD PRESSURE: 112 MMHG | HEART RATE: 92 BPM | OXYGEN SATURATION: 95 % | BODY MASS INDEX: 26.29 KG/M2 | DIASTOLIC BLOOD PRESSURE: 70 MMHG | RESPIRATION RATE: 18 BRPM | TEMPERATURE: 96.3 F | HEIGHT: 64 IN

## 2023-03-24 DIAGNOSIS — Z79.4 TYPE 2 DIABETES MELLITUS WITH DIABETIC POLYNEUROPATHY, WITH LONG-TERM CURRENT USE OF INSULIN (HCC): ICD-10-CM

## 2023-03-24 DIAGNOSIS — I10 PRIMARY HYPERTENSION: ICD-10-CM

## 2023-03-24 DIAGNOSIS — E11.42 TYPE 2 DIABETES MELLITUS WITH DIABETIC POLYNEUROPATHY, WITH LONG-TERM CURRENT USE OF INSULIN (HCC): ICD-10-CM

## 2023-03-24 DIAGNOSIS — F03.A0 MILD DEMENTIA, UNSPECIFIED DEMENTIA TYPE, UNSPECIFIED WHETHER BEHAVIORAL, PSYCHOTIC, OR MOOD DISTURBANCE OR ANXIETY (HCC): ICD-10-CM

## 2023-03-24 DIAGNOSIS — Z76.89 ENCOUNTER TO ESTABLISH CARE: Primary | ICD-10-CM

## 2023-03-24 LAB — SL AMB POCT HEMOGLOBIN AIC: 8.3 (ref ?–6.5)

## 2023-03-24 RX ORDER — LANOLIN ALCOHOL/MO/W.PET/CERES
CREAM (GRAM) TOPICAL
COMMUNITY
Start: 2023-03-14

## 2023-03-24 RX ORDER — EMPAGLIFLOZIN 25 MG/1
TABLET, FILM COATED ORAL
COMMUNITY
Start: 2023-03-14

## 2023-03-24 RX ORDER — CONJUGATED ESTROGENS 0.62 MG/G
CREAM VAGINAL
COMMUNITY
Start: 2022-12-08

## 2023-03-24 RX ORDER — SERTRALINE HYDROCHLORIDE 25 MG/1
TABLET, FILM COATED ORAL
COMMUNITY
Start: 2023-03-14

## 2023-03-24 RX ORDER — INSULIN GLARGINE AND LIXISENATIDE 100; 33 U/ML; UG/ML
22 INJECTION, SOLUTION SUBCUTANEOUS
COMMUNITY
Start: 2023-01-28

## 2023-03-24 RX ORDER — MIRTAZAPINE 45 MG/1
TABLET, FILM COATED ORAL
COMMUNITY
Start: 2023-03-14

## 2023-03-24 NOTE — ASSESSMENT & PLAN NOTE
BP Readings from Last 3 Encounters:   03/24/23 112/70   04/15/22 120/68     Continue with atenolol 100 mg, prinzide, Zestoretic 20-12 5

## 2023-03-24 NOTE — ASSESSMENT & PLAN NOTE
Lab Results   Component Value Date    HGBA1C 8 3 (A) 03/24/2023     Patient reports she does not check her blood sugar at all and is not following any type of diet     A1C mildly improved from previous of 8 5   Continue to follow with endocrinology   Continue with metformin 750 bid, insulin Soliqua and Jardiance 25 mg

## 2023-03-24 NOTE — ASSESSMENT & PLAN NOTE
Patient reports prior in home care but was discontinued   Requesting assistance with re-instituting home care services, speech therapy at [facility] and filling out medical certification for disability exception document for citizenship    Provider referral to social work for assistance  Should make appointment with Dr Bashir Fish for citizenship physical

## 2023-03-24 NOTE — PROGRESS NOTES
Name: Leo Meyers      : 1950      MRN: [id number]  Encounter Provider: AG Pereira  Encounter Date: 3/24/2023   Encounter department: [address] [place] [address]     1  Encounter to establish care    2  Primary hypertension  Assessment & Plan:  BP Readings from Last 3 Encounters:   23 112/70   04/15/22 120/68     Continue with atenolol 100 mg, prinzide, Zestoretic 20-12 5      3  Type 2 diabetes mellitus with diabetic polyneuropathy, with long-term current use of insulin [facility])  Assessment & Plan:    Lab Results   Component Value Date    HGBA1C 8 3 (A) 2023     Patient reports she does not check her blood sugar at all and is not following any type of diet  A1C mildly improved from previous of 8 5   Continue to follow with endocrinology   Continue with metformin 750 bid, insulin Soliqua and Jardiance 25 mg       Orders:  -     POCT hemoglobin A1c    4  Mild dementia, unspecified dementia type, unspecified whether behavioral, psychotic, or mood disturbance or anxiety  Assessment & Plan:  Patient reports prior in home care but was discontinued  Requesting assistance with re-instituting home care services, speech therapy at [facility] and filling out medical certification for disability exception document for citizenship    Provider referral to social work for assistance  Should make appointment with Dr Terrence Silver for citizenship physical     Orders:  -     Ambulatory Referral to Social Work Care Management Program; Future    BMI Counseling: Body mass index is 26 43 kg/m²  The BMI is above normal  Nutrition recommendations include decreasing portion sizes, encouraging healthy choices of fruits and vegetables, decreasing fast food intake, consuming healthier snacks, limiting drinks that contain sugar, moderation in carbohydrate intake, increasing intake of lean protein, reducing intake of saturated and trans fat and reducing intake of cholesterol  Exercise recommendations include exercising 3-5 times per week  No pharmacotherapy was ordered  Rationale for BMI follow-up plan is due to patient being overweight or obese  Fidel Hoyos 74 y o  female has a past medical history of Dementia ([address] ), Diabetes mellitus ([facility] [address] ), Heart murmur, and Hypertension  Presenting today to establish care, patient presenting today with daughter  Was being seen at [facility] per patient insurance no longer accepted at that office  Patient today has chronic complaints of back pain, sleep disturbance, urinary incontinence, arthralgias  Denies chest pain, SOB, dyspnea  Patient being followed by many specialties mostly at [facility] including endocrine, urology, spine specialist and psychiatry  Although present with daughter, patient is a poor historian unable to verify complete list of medications  Encouraged patient to bring list of medications to next visit  The following portions of the patient's history were reviewed and updated as appropriate: allergies, current medications, past family history, past medical history, past social history, past surgical history and problem list     In preparation for this visit all prior office notes, prior consultations, emergency room visits, blood work results, and imaging studies were personally reviewed   A total of 5 minutes was spent reviewing all of this information  Review of Systems   Constitutional: Negative for chills and fever  HENT: Negative for ear pain and sore throat  Eyes: Negative for pain and visual disturbance  Respiratory: Negative for cough and shortness of breath  Cardiovascular: Negative for chest pain and palpitations  Gastrointestinal: Negative for abdominal pain and vomiting  Genitourinary: Negative for dysuria and hematuria  Musculoskeletal: Positive for arthralgias and back pain  Skin: Negative for color change and rash  Neurological: Negative for seizures and syncope  Psychiatric/Behavioral: Positive for sleep disturbance (chronic)  All other systems reviewed and are negative        Current Outpatient Medications on File Prior to Visit   Medication Sig   • estrogens, conjugated (Premarin) vaginal cream APPLY A PEA SIZE AMOUNT OF THE ESTROGEN CREAM TO THE OPENING OF THE VAGINA EVERY NIGHT FOR 2 WEEKS THEN THREE TIMES A NIGHT   • Insulin Glargine-Lixisenatide (Soliqua) 100 units-33 mcg/mL injection pen Inject 22 Units under the skin   • ammonium lactate (LAC-HYDRIN) 12 % cream Apply 1 application topically   • atenolol (TENORMIN) 100 mg tablet Take 100 mg by mouth   • azelastine (ASTELIN) 0 1 % nasal spray 1-2 sprays into each nostril 2 (two) times a day   • Cetirizine HCl (Zerviate) 0 24 % SOLN Apply 1 drop to eye   • cyanocobalamin (VITAMIN B-12) 1000 MCG tablet Take 1,000 mcg by mouth daily   • ergocalciferol (ERGOCALCIFEROL) 1 25 MG (03573 UT) capsule Take 1 capsule by mouth once a week   • escitalopram (LEXAPRO) 10 mg tablet Take by mouth   • FLUoxetine (PROzac) 20 mg capsule Take 20 mg by mouth daily   • gabapentin (NEURONTIN) 300 mg capsule Take 300 mg by mouth   • glimepiride (AMARYL) 1 mg tablet Take 1 mg by mouth daily   • glucose blood test strip Use Three times a day   • hydrOXYzine HCL (ATARAX) 25 mg tablet Take 25 mg by mouth Three times a day   • insulin glargine (Lantus SoloStar) 100 units/mL injection pen Inject 18u sc daily   • Insulin Pen Needle 32G X 6 MM MISC Use qith solostar pen daily   • Jardiance 25 MG TABS    • latanoprost (XALATAN) 0 005 % ophthalmic solution Apply to eye   • latanoprost (XALATAN) 0 005 % ophthalmic solution Apply to eye   • lisinopril-hydrochlorothiazide (PRINZIDE,ZESTORETIC) 20-12 5 MG per tablet Take 1 tablet by mouth daily   • magnesium oxide (MAG-OX) 400 mg tablet Take 400 mg by mouth daily   • magnesium Oxide (MAG-OX) 400 mg TABS    • Melatonin ER 10 MG TBCR Take 10 mg by mouth   • metFORMIN (GLUCOPHAGE-XR) 750 mg 24 hr tablet Take 750 mg by mouth   • Mirabegron ER (Myrbetriq) 50 MG TB24 Take 50 mg by mouth daily   • mirtazapine (REMERON) 45 MG tablet    • montelukast (SINGULAIR) 10 mg tablet Take 10 mg by mouth daily   • olopatadine (PATANOL) 0 1 % ophthalmic solution Apply 1 drop to eye 2 (two) times a day   • polyethylene glycol-propylene glycol (Systane) 0 4-0 3 % Apply 1 drop to eye Three times a day   • sertraline (ZOLOFT) 25 mg tablet    • simvastatin (ZOCOR) 10 mg tablet Take 10 mg by mouth   • solifenacin (VESICARE) 10 MG tablet Take by mouth   • [DISCONTINUED] sitaGLIPtin-metFORMIN (Janumet)  MG per tablet Take by mouth       Objective     /70 (BP Location: Right arm, Patient Position: Sitting, Cuff Size: Adult)   Pulse 92   Temp (!) 96 3 °F (35 7 °C) (Temporal)   Resp 18   Ht 5' 4" (1 626 m)   Wt 69 9 kg (154 lb)   SpO2 95%   BMI 26 43 kg/m²     Physical Exam  Vitals and nursing note reviewed  Constitutional:       General: She is not in acute distress  Appearance: Normal appearance  She is not ill-appearing  HENT:      Head: Normocephalic and atraumatic  Right Ear: Tympanic membrane, ear canal and external ear normal       Left Ear: Tympanic membrane, ear canal and external ear normal       Nose: Nose normal       Mouth/Throat:      Mouth: Mucous membranes are moist    Eyes:      General:         Right eye: No discharge  Left eye: No discharge  Pupils: Pupils are equal, round, and reactive to light  Cardiovascular:      Rate and Rhythm: Normal rate and regular rhythm  Pulses: Normal pulses  Heart sounds: Murmur heard  Pulmonary:      Effort: Pulmonary effort is normal  No respiratory distress  Breath sounds: Normal breath sounds  No wheezing  Abdominal:      General: Bowel sounds are normal       Palpations: Abdomen is soft  Tenderness: There is no abdominal tenderness  There is no right CVA tenderness or left CVA tenderness     Musculoskeletal: General: Normal range of motion  Cervical back: Normal range of motion  Skin:     General: Skin is warm and dry  Neurological:      General: No focal deficit present  Mental Status: She is alert and oriented to person, place, and time  Psychiatric:         Attention and Perception: Attention normal          Mood and Affect: Affect is flat           Speech: Speech normal        Vinod Lowe, [facility]

## 2023-04-05 ENCOUNTER — TELEPHONE (OUTPATIENT)
Dept: FAMILY MEDICINE CLINIC | Facility: CLINIC | Age: 73
End: 2023-04-05

## 2023-04-05 DIAGNOSIS — E11.42 TYPE 2 DIABETES MELLITUS WITH DIABETIC POLYNEUROPATHY, WITH LONG-TERM CURRENT USE OF INSULIN (HCC): Primary | ICD-10-CM

## 2023-04-05 DIAGNOSIS — Z79.4 TYPE 2 DIABETES MELLITUS WITH DIABETIC POLYNEUROPATHY, WITH LONG-TERM CURRENT USE OF INSULIN (HCC): Primary | ICD-10-CM

## 2023-04-05 NOTE — TELEPHONE ENCOUNTER
Patient is requesting a ambulatory ref for her diabetic doctor  Dr Vini Becker  She has an appointment yesterday and they told her she needs one  Please advise, thank you

## 2023-04-11 NOTE — TELEPHONE ENCOUNTER
Pt left a msg in the nurse line stating that she needs a referral for Endo  Called pt and let her know that the referral is already placed since 4/5/23  Pt seems like she is not understanding and is upset   Informed pt if she wants she can come and  the referral

## 2023-04-12 NOTE — TELEPHONE ENCOUNTER
Pt left a msg in the nurse line to see if we can fax the Endocrinology referral to Dr Kelly Nance office  Referral was faxed to the  # that pt give us [phone]  Confirmation was received

## 2023-04-21 ENCOUNTER — TELEPHONE (OUTPATIENT)
Dept: FAMILY MEDICINE CLINIC | Facility: CLINIC | Age: 73
End: 2023-04-21

## 2023-05-22 ENCOUNTER — HOSPITAL ENCOUNTER (EMERGENCY)
Facility: HOSPITAL | Age: 73
Discharge: HOME | End: 2023-05-22
Attending: INTERNAL MEDICINE

## 2023-05-22 VITALS
DIASTOLIC BLOOD PRESSURE: 78 MMHG | RESPIRATION RATE: 20 BRPM | OXYGEN SATURATION: 93 % | HEART RATE: 77 BPM | WEIGHT: 152.8 LBS | TEMPERATURE: 99.7 F | BODY MASS INDEX: 26.23 KG/M2 | SYSTOLIC BLOOD PRESSURE: 134 MMHG

## 2023-05-22 DIAGNOSIS — H10.32 ACUTE CONJUNCTIVITIS OF LEFT EYE: Primary | ICD-10-CM

## 2023-05-22 RX ORDER — ERYTHROMYCIN 5 MG/G
0.5 OINTMENT OPHTHALMIC EVERY 6 HOURS
Qty: 3.5 G | Refills: 0 | Status: SHIPPED | OUTPATIENT
Start: 2023-05-22 | End: 2023-06-01

## 2023-05-22 NOTE — ED PROVIDER NOTES
History  Chief Complaint   Patient presents with   • Eye Pain     Pt reports via , that she has had bilateral eye pain since yesterday morning, reports only the left eye hurts now, though  Denies getting anything in her eye  Reports she has chronic dry eye      75 y/o female, pmhx of glaucoma, diabetes hypertension, hyperlipidemia and reports she has had surgery to clear her glaucoma in the past and presents today for evaluation of 1 day of left eye redness irritation and discharge patient reports she woke up today and had crusting of her eye  Patient denies any vision changes and reports she does not use contact lenses  Patient reports an itching irritation to the left eye reports no changes to the right eye  Patient denies any fevers  Prior to Admission Medications   Prescriptions Last Dose Informant Patient Reported? Taking?    Cetirizine HCl (Zerviate) 0 24 % SOLN   Yes No   Sig: Apply 1 drop to eye   FLUoxetine (PROzac) 20 mg capsule   Yes No   Sig: Take 20 mg by mouth daily   Insulin Glargine-Lixisenatide (Soliqua) 100 units-33 mcg/mL injection pen   Yes No   Sig: Inject 22 Units under the skin   Insulin Pen Needle 32G X 6 MM MISC   Yes No   Sig: Use qith solostar pen daily   Jardiance 25 MG TABS   Yes No   Mirabegron ER (Myrbetriq) 50 MG TB24   Yes No   Sig: Take 50 mg by mouth daily   ammonium lactate (LAC-HYDRIN) 12 % cream   Yes No   Sig: Apply 1 application topically   atenolol (TENORMIN) 100 mg tablet   Yes No   Sig: Take 100 mg by mouth   azelastine (ASTELIN) 0 1 % nasal spray   Yes No   Si-2 sprays into each nostril 2 (two) times a day   cyanocobalamin (VITAMIN B-12) 1000 MCG tablet   Yes No   Sig: Take 1,000 mcg by mouth daily   ergocalciferol (ERGOCALCIFEROL) 1 25 MG (42396 UT) capsule   Yes No   Sig: Take 1 capsule by mouth once a week   escitalopram (LEXAPRO) 10 mg tablet   Yes No   Sig: Take by mouth   estrogens, conjugated (Premarin) vaginal cream   Yes No   Sig: APPLY A PEA SIZE AMOUNT OF THE ESTROGEN CREAM TO THE OPENING OF THE VAGINA EVERY NIGHT FOR 2 WEEKS THEN THREE TIMES A NIGHT   gabapentin (NEURONTIN) 300 mg capsule   Yes No   Sig: Take 300 mg by mouth   glimepiride (AMARYL) 1 mg tablet   Yes No   Sig: Take 1 mg by mouth daily   glucose blood test strip   Yes No   Sig: Use Three times a day   hydrOXYzine HCL (ATARAX) 25 mg tablet   Yes No   Sig: Take 25 mg by mouth Three times a day   insulin glargine (Lantus SoloStar) 100 units/mL injection pen   Yes No   Sig: Inject 18u sc daily   latanoprost (XALATAN) 0 005 % ophthalmic solution   Yes No   Sig: Apply to eye   latanoprost (XALATAN) 0 005 % ophthalmic solution   Yes No   Sig: Apply to eye   lisinopril-hydrochlorothiazide (PRINZIDE,ZESTORETIC) 20-12 5 MG per tablet   Yes No   Sig: Take 1 tablet by mouth daily   magnesium Oxide (MAG-OX) 400 mg TABS   Yes No   metFORMIN (GLUCOPHAGE-XR) 750 mg 24 hr tablet   Yes No   Sig: Take 750 mg by mouth   mirtazapine (REMERON) 45 MG tablet   Yes No   montelukast (SINGULAIR) 10 mg tablet   Yes No   Sig: Take 10 mg by mouth daily   olopatadine (PATANOL) 0 1 % ophthalmic solution   Yes No   Sig: Apply 1 drop to eye 2 (two) times a day   polyethylene glycol-propylene glycol (Systane) 0 4-0 3 %   Yes No   Sig: Apply 1 drop to eye Three times a day   sertraline (ZOLOFT) 25 mg tablet   Yes No   simvastatin (ZOCOR) 10 mg tablet   Yes No   Sig: Take 10 mg by mouth   solifenacin (VESICARE) 10 MG tablet   Yes No   Sig: Take by mouth      Facility-Administered Medications: None       Past Medical History:   Diagnosis Date   • Breast cancer (HCC)    • Dementia (HCC)    • Heart murmur        Past Surgical History:   Procedure Laterality Date   • APPENDECTOMY     •  SECTION     • HYSTERECTOMY     • KIDNEY SURGERY     • LUMBAR DISC SURGERY     • THROAT SURGERY         Family History   Problem Relation Age of Onset   • Hypertension Mother    • Diabetes Mother    • No Known Problems Father      I have reviewed and agree with the history as documented  E-Cigarette/Vaping   • E-Cigarette Use Never User      E-Cigarette/Vaping Substances     Social History     Tobacco Use   • Smoking status: Never   • Smokeless tobacco: Never   Vaping Use   • Vaping Use: Never used   Substance Use Topics   • Alcohol use: Never   • Drug use: Never       Review of Systems   Constitutional: Negative for chills, fatigue and fever  HENT: Negative for congestion, ear pain, rhinorrhea and sore throat  Eyes: Positive for pain, discharge, redness and itching  Respiratory: Negative for chest tightness and shortness of breath  Cardiovascular: Negative for chest pain and palpitations  Gastrointestinal: Negative for abdominal pain, nausea and vomiting  Genitourinary: Negative for dysuria and hematuria  Musculoskeletal: Negative  Skin: Negative for rash  Neurological: Negative for dizziness, syncope, light-headedness and numbness  Physical Exam  Physical Exam  Vitals and nursing note reviewed  Constitutional:       Appearance: She is well-developed  HENT:      Head: Normocephalic  Eyes:      General: No scleral icterus  Comments: Scleral injection noted to the left eye  Pupils are equal and round reactive to light  Normal extraocular motion without nystagmus or pain  No surrounding erythema or swelling  Cardiovascular:      Rate and Rhythm: Normal rate and regular rhythm  Pulmonary:      Effort: Pulmonary effort is normal       Breath sounds: Normal breath sounds  No stridor  Abdominal:      General: There is no distension  Palpations: Abdomen is soft  Tenderness: There is no abdominal tenderness  Musculoskeletal:         General: Normal range of motion  Skin:     General: Skin is warm and dry  Capillary Refill: Capillary refill takes less than 2 seconds  Neurological:      Mental Status: She is alert and oriented to person, place, and time           Vital Signs  ED Triage Vitals [05/22/23 1231]   Temperature Pulse Respirations Blood Pressure SpO2   99 7 °F (37 6 °C) 77 20 134/78 93 %      Temp Source Heart Rate Source Patient Position - Orthostatic VS BP Location FiO2 (%)   Oral Monitor Sitting Left arm --      Pain Score       --           Vitals:    05/22/23 1231   BP: 134/78   Pulse: 77   Patient Position - Orthostatic VS: Sitting         Visual Acuity  Visual Acuity    Flowsheet Row Most Recent Value   Visual acuity R eye is 20/50   Visual acuity Left eye is 20/70   Visual acuity in both eyes is 20/25   Wearing corrective eyewear/lenses? No          ED Medications  Medications - No data to display    Diagnostic Studies  Results Reviewed     None                 No orders to display              Procedures  Procedures         ED Course                               SBIRT 20yo+    Flowsheet Row Most Recent Value   Initial Alcohol Screen: US AUDIT-C     1  How often do you have a drink containing alcohol? 0 Filed at: 05/22/2023 1235   2  How many drinks containing alcohol do you have on a typical day you are drinking? 0 Filed at: 05/22/2023 1235   3b  FEMALE Any Age, or MALE 65+: How often do you have 4 or more drinks on one occassion? 0 Filed at: 05/22/2023 1235   Audit-C Score 0 Filed at: 05/22/2023 1235   XIMENA: How many times in the past year have you    Used an illegal drug or used a prescription medication for non-medical reasons? Never Filed at: 05/22/2023 1235                    Medical Decision Making  25-year-old female presenting for evaluation of left eye redness irritation crusting and discharge consistent with conjunctivitis will treat for bacterial conjunctivitis in the setting of a elderly female history of diabetes, potential for immunocompromisation  Patient's physical exam is without any evidence for deep space infection    Specifically the patient does not have any vision changes, fevers, periorbital erythema or pain on extraocular movements to suggest a "periorbital or orbital cellulitis  Therefore no blood work or imaging is indicated at this time and no further work-up will be pursued  Strict return to ED precautions discussed  Patient and/or family members verbalizes understanding and agrees with plan  Patient is stable for discharge     Portions of the record may have been created with voice recognition software  Occasional wrong word or \"sound a like\" substitutions may have occurred due to the inherent limitations of voice recognition software  Read the chart carefully and recognize, using context, where substitutions have occurred  Disposition  Final diagnoses:   Acute conjunctivitis of left eye     Time reflects when diagnosis was documented in both MDM as applicable and the Disposition within this note     Time User Action Codes Description Comment    5/22/2023 12:49 PM Alexi Sawyer Add [H10 32] Acute conjunctivitis of left eye       ED Disposition     ED Disposition   Discharge    Condition   Stable    Date/Time   Mon May 22, 2023 12:49 PM    Comment   Arriaga Masker discharge to home/self care  Follow-up Information     Follow up With Specialties Details Why Tete Mendenhall MD Family Medicine Schedule an appointment as soon as possible for a visit  As needed [address]  [address] [facility]  [place] [address]             Patient's Medications   Discharge Prescriptions    ERYTHROMYCIN (ILOTYCIN) OPHTHALMIC OINTMENT    Administer 0 5 inches to both eyes every 6 (six) hours for 10 days       Start Date: 5/22/2023 End Date: 6/1/2023       Order Dose: 0 5 inches       Quantity: 3 5 g    Refills: 0       No discharge procedures on file      PDMP Review     None          ED Provider  Electronically Signed by           Gera Vazquez PA-C  05/22/23 1300    "

## 2023-06-20 ENCOUNTER — TELEPHONE (OUTPATIENT)
Dept: FAMILY MEDICINE CLINIC | Facility: CLINIC | Age: 73
End: 2023-06-20

## 2023-06-20 NOTE — TELEPHONE ENCOUNTER
PT LVM REQUESTING DERMATOLOGY REFERRAL, I CALLED BACK THE PT AND PT STATED SHE HAD RASH, I OFFERED AND APPT

## 2023-06-22 ENCOUNTER — OFFICE VISIT (OUTPATIENT)
Dept: FAMILY MEDICINE CLINIC | Facility: CLINIC | Age: 73
End: 2023-06-22

## 2023-06-22 VITALS
OXYGEN SATURATION: 97 % | DIASTOLIC BLOOD PRESSURE: 62 MMHG | RESPIRATION RATE: 19 BRPM | WEIGHT: 154 LBS | HEART RATE: 64 BPM | SYSTOLIC BLOOD PRESSURE: 120 MMHG | TEMPERATURE: 97.6 F | BODY MASS INDEX: 26.29 KG/M2 | HEIGHT: 64 IN

## 2023-06-22 DIAGNOSIS — M54.42 CHRONIC MIDLINE LOW BACK PAIN WITH BILATERAL SCIATICA: ICD-10-CM

## 2023-06-22 DIAGNOSIS — M54.41 CHRONIC MIDLINE LOW BACK PAIN WITH BILATERAL SCIATICA: ICD-10-CM

## 2023-06-22 DIAGNOSIS — G89.29 CHRONIC MIDLINE LOW BACK PAIN WITH BILATERAL SCIATICA: ICD-10-CM

## 2023-06-22 DIAGNOSIS — Z91.09 ENVIRONMENTAL ALLERGIES: Primary | ICD-10-CM

## 2023-06-22 PROCEDURE — 3078F DIAST BP <80 MM HG: CPT

## 2023-06-22 PROCEDURE — 99214 OFFICE O/P EST MOD 30 MIN: CPT

## 2023-06-22 PROCEDURE — 3074F SYST BP LT 130 MM HG: CPT

## 2023-06-22 RX ORDER — LIDOCAINE 50 MG/G
OINTMENT TOPICAL AS NEEDED
Qty: 35.44 G | Refills: 0 | Status: SHIPPED | OUTPATIENT
Start: 2023-06-22

## 2023-06-22 RX ORDER — METHYLPREDNISOLONE 4 MG/1
TABLET ORAL
Qty: 21 EACH | Refills: 0 | Status: SHIPPED | OUTPATIENT
Start: 2023-06-22

## 2023-06-22 RX ORDER — FLUTICASONE PROPIONATE 50 MCG
1 SPRAY, SUSPENSION (ML) NASAL DAILY
Qty: 18.2 ML | Refills: 5 | Status: SHIPPED | OUTPATIENT
Start: 2023-06-22 | End: 2023-06-22

## 2023-06-22 RX ORDER — TRIAMCINOLONE ACETONIDE 1 MG/G
CREAM TOPICAL 2 TIMES DAILY
Qty: 30 G | Refills: 0 | Status: SHIPPED | OUTPATIENT
Start: 2023-06-22

## 2023-06-22 RX ORDER — CETIRIZINE HYDROCHLORIDE 10 MG/1
10 TABLET, CHEWABLE ORAL DAILY
Qty: 30 TABLET | Refills: 2 | Status: SHIPPED | OUTPATIENT
Start: 2023-06-22 | End: 2023-09-20

## 2023-06-22 NOTE — ASSESSMENT & PLAN NOTE
Discussed non pharmacological pain interventions including: heat, ice, stretching, strengthening exercises, biofeedback, meditation, yoga, massage   Denies numbness/ tingling, incontinence, falls, weakness, fever, saddle anesthesia  Start methylprednisolone 4 mg pack, lidocaine cream prn   Continue with gabapentin 300 mg hs   Continue to follow with pain management

## 2023-06-22 NOTE — ASSESSMENT & PLAN NOTE
Reports generalized allergy symptoms: nasal congestion, ear itchiness, eye redness, generalized body urticaria  No abnormalities present on exam  Start zyrtec 10 mg daily, continue with flonase, start kenalog for ear irritation  Follow up if symptoms worsen or do not improve  Pt requesting to see allergies, referral provided

## 2023-06-22 NOTE — PROGRESS NOTES
Name: Eva Spears      : 1950      MRN: [id number]  Encounter Provider: AG Carrasco  Encounter Date: 2023   Encounter department: [address] [place] [address]  Environmental allergies  Assessment & Plan:  Reports generalized allergy symptoms: nasal congestion, ear itchiness, eye redness, generalized body urticaria  No abnormalities present on exam  Start zyrtec 10 mg daily, continue with flonase, start kenalog for ear irritation  Follow up if symptoms worsen or do not improve  Pt requesting to see allergies, referral provided  Orders:  -     Ambulatory Referral to Allergy; Future  -     cetirizine (ZyrTEC) 10 MG chewable tablet; Chew 1 tablet (10 mg total) daily  -     triamcinolone (KENALOG) 0 1 % cream; Apply topically 2 (two) times a day    2  Chronic midline low back pain with bilateral sciatica  Assessment & Plan:  Discussed non pharmacological pain interventions including: heat, ice, stretching, strengthening exercises, biofeedback, meditation, yoga, massage   Denies numbness/ tingling, incontinence, falls, weakness, fever, saddle anesthesia  Start methylprednisolone 4 mg pack, lidocaine cream prn   Continue with gabapentin 300 mg hs   Continue to follow with pain management  Orders:  -     methylPREDNISolone 4 MG tablet therapy pack; Use as directed on package  -     lidocaine (XYLOCAINE) 5 % ointment; Apply topically as needed for mild pain         Subjective      Graylon Meals 74 y o  female  has a past medical history of Breast cancer ([facility] [address] ), Dementia ([facility] [address] ), and Heart murmur  Presenting today allergies, itchiness in generalize body, and blt ears  No rash present on exam  Patient reporting chronic back pain, is currently seeing pain management for the same was given injection with minimal relief  Back Pain  This is a chronic problem  The current episode started more than 1 year ago  The problem occurs daily   The problem has been waxing and waning since onset  The pain is present in the lumbar spine  The quality of the pain is described as aching  The pain radiates to the right thigh and left thigh  The pain is at a severity of 7/10  The pain is moderate  The pain is the same all the time  The symptoms are aggravated by bending, position, sitting, standing and twisting  Stiffness is present all day  Associated symptoms include paresthesias  Pertinent negatives include no abdominal pain, bladder incontinence, bowel incontinence, chest pain, dysuria, fever, leg pain, numbness, perianal numbness or tingling  Risk factors include sedentary lifestyle and menopause  She has tried muscle relaxant for the symptoms  The treatment provided mild relief  Review of Systems   Constitutional: Negative for chills and fever  HENT: Positive for congestion  Negative for ear pain and sore throat  Eyes: Negative for pain and visual disturbance  Respiratory: Negative for cough and shortness of breath  Cardiovascular: Negative for chest pain and palpitations  Gastrointestinal: Negative for abdominal pain, bowel incontinence and vomiting  Genitourinary: Negative for bladder incontinence, dysuria and hematuria  Musculoskeletal: Positive for arthralgias and back pain  Skin: Negative for color change and rash  Neurological: Positive for paresthesias  Negative for tingling, seizures, syncope and numbness  All other systems reviewed and are negative        Current Outpatient Medications on File Prior to Visit   Medication Sig   • ammonium lactate (LAC-HYDRIN) 12 % cream Apply 1 application topically   • atenolol (TENORMIN) 100 mg tablet Take 100 mg by mouth   • azelastine (ASTELIN) 0 1 % nasal spray 1-2 sprays into each nostril 2 (two) times a day   • Cetirizine HCl (Zerviate) 0 24 % SOLN Apply 1 drop to eye   • cyanocobalamin (VITAMIN B-12) 1000 MCG tablet Take 1,000 mcg by mouth daily   • ergocalciferol (ERGOCALCIFEROL) 1 25 MG (80826 "UT) capsule Take 1 capsule by mouth once a week   • escitalopram (LEXAPRO) 10 mg tablet Take by mouth   • estrogens, conjugated (Premarin) vaginal cream APPLY A PEA SIZE AMOUNT OF THE ESTROGEN CREAM TO THE OPENING OF THE VAGINA EVERY NIGHT FOR 2 WEEKS THEN THREE TIMES A NIGHT   • FLUoxetine (PROzac) 20 mg capsule Take 20 mg by mouth daily   • gabapentin (NEURONTIN) 300 mg capsule Take 300 mg by mouth   • glimepiride (AMARYL) 1 mg tablet Take 1 mg by mouth daily   • glucose blood test strip Use Three times a day   • hydrOXYzine HCL (ATARAX) 25 mg tablet Take 25 mg by mouth Three times a day   • insulin glargine (Lantus SoloStar) 100 units/mL injection pen Inject 18u sc daily   • Insulin Glargine-Lixisenatide (Soliqua) 100 units-33 mcg/mL injection pen Inject 22 Units under the skin   • Insulin Pen Needle 32G X 6 MM MISC Use qith solostar pen daily   • Jardiance 25 MG TABS    • latanoprost (XALATAN) 0 005 % ophthalmic solution Apply to eye   • latanoprost (XALATAN) 0 005 % ophthalmic solution Apply to eye   • lisinopril-hydrochlorothiazide (PRINZIDE,ZESTORETIC) 20-12 5 MG per tablet Take 1 tablet by mouth daily   • magnesium Oxide (MAG-OX) 400 mg TABS    • metFORMIN (GLUCOPHAGE-XR) 750 mg 24 hr tablet Take 750 mg by mouth   • Mirabegron ER (Myrbetriq) 50 MG TB24 Take 50 mg by mouth daily   • mirtazapine (REMERON) 45 MG tablet    • montelukast (SINGULAIR) 10 mg tablet Take 10 mg by mouth daily   • olopatadine (PATANOL) 0 1 % ophthalmic solution Apply 1 drop to eye 2 (two) times a day   • polyethylene glycol-propylene glycol (Systane) 0 4-0 3 % Apply 1 drop to eye Three times a day   • sertraline (ZOLOFT) 25 mg tablet    • simvastatin (ZOCOR) 10 mg tablet Take 10 mg by mouth   • solifenacin (VESICARE) 10 MG tablet Take by mouth       Objective     /62 (BP Location: Right arm, Patient Position: Sitting, Cuff Size: Standard)   Pulse 64   Temp 97 6 °F (36 4 °C) (Temporal)   Resp 19   Ht 5' 4\" (1 626 m)   Wt 69 9 " kg (154 lb)   SpO2 97%   BMI 26 43 kg/m²     Physical Exam  Vitals and nursing note reviewed  Constitutional:       General: She is not in acute distress  Appearance: Normal appearance  She is not ill-appearing  HENT:      Head: Normocephalic and atraumatic  Right Ear: Tympanic membrane, ear canal and external ear normal       Left Ear: Tympanic membrane, ear canal and external ear normal       Nose: Nose normal       Mouth/Throat:      Mouth: Mucous membranes are moist    Eyes:      General:         Right eye: No discharge  Left eye: No discharge  Pupils: Pupils are equal, round, and reactive to light  Cardiovascular:      Rate and Rhythm: Normal rate and regular rhythm  Pulses: Normal pulses  Heart sounds: Normal heart sounds  Pulmonary:      Effort: Pulmonary effort is normal  No respiratory distress  Breath sounds: Normal breath sounds  No wheezing  Abdominal:      General: Bowel sounds are normal       Palpations: Abdomen is soft  Tenderness: There is no abdominal tenderness  There is no right CVA tenderness or left CVA tenderness  Musculoskeletal:      Cervical back: Normal range of motion  Lumbar back: Tenderness present  Skin:     General: Skin is warm and dry  Neurological:      General: No focal deficit present  Mental Status: She is alert and oriented to person, place, and time         AG Burnett

## 2023-06-30 ENCOUNTER — OFFICE VISIT (OUTPATIENT)
Dept: FAMILY MEDICINE CLINIC | Facility: CLINIC | Age: 73
End: 2023-06-30

## 2023-06-30 VITALS
BODY MASS INDEX: 26.26 KG/M2 | HEIGHT: 64 IN | RESPIRATION RATE: 18 BRPM | WEIGHT: 153.8 LBS | TEMPERATURE: 97.8 F | OXYGEN SATURATION: 95 % | SYSTOLIC BLOOD PRESSURE: 116 MMHG | DIASTOLIC BLOOD PRESSURE: 70 MMHG | HEART RATE: 77 BPM

## 2023-06-30 DIAGNOSIS — F03.A0 MILD DEMENTIA WITHOUT BEHAVIORAL DISTURBANCE, PSYCHOTIC DISTURBANCE, MOOD DISTURBANCE, OR ANXIETY, UNSPECIFIED DEMENTIA TYPE (HCC): ICD-10-CM

## 2023-06-30 DIAGNOSIS — Z79.4 TYPE 2 DIABETES MELLITUS WITH DIABETIC POLYNEUROPATHY, WITH LONG-TERM CURRENT USE OF INSULIN (HCC): Primary | ICD-10-CM

## 2023-06-30 DIAGNOSIS — I10 PRIMARY HYPERTENSION: ICD-10-CM

## 2023-06-30 DIAGNOSIS — E11.42 TYPE 2 DIABETES MELLITUS WITH DIABETIC POLYNEUROPATHY, WITH LONG-TERM CURRENT USE OF INSULIN (HCC): Primary | ICD-10-CM

## 2023-06-30 DIAGNOSIS — Z91.09 ENVIRONMENTAL ALLERGIES: ICD-10-CM

## 2023-06-30 NOTE — PROGRESS NOTES
Name: Junior Marvin      : 1950      MRN: [id number]  Encounter Provider: Traci Ochoa MD  Encounter Date: 2023   Encounter department: [facility]     1  Type 2 diabetes mellitus with diabetic polyneuropathy, with long-term current use of insulin ([facility] [address] )    2  Primary hypertension    3  Mild dementia without behavioral disturbance, psychotic disturbance, mood disturbance, or anxiety, unspecified dementia type [facility])  -     Ambulatory Referral to Senior Care; Future    4  Environmental allergies  -     Ambulatory Referral to Allergy; Future         Counseled on importance of eating 3 meals a day  Increase lean protein  Become more physically active; walk 20 to 30 minutes daily  Increase mental and social activity as well; Referral for Cognitive Therapy at [facility] with English therapist  Subjective      73 yo  female with uncontrolled DM, followed by Endocrinology  As per patient she has been having low blood sugar in the early morning hours  Patient admits she does not follow a low carb diet  She states she never eats break fast and eats rice with lunch and dinner  States she does not have an appetite for proteins   She is requesting a referral for Geriatrics as she states she wants to see if her dementia is progressing  Continues to complain of allergy symptoms, has not made follow up appointment with Allergist      Review of Systems   HENT: Positive for congestion, postnasal drip and sneezing  Eyes: Positive for redness and itching  All other systems reviewed and are negative        Current Outpatient Medications on File Prior to Visit   Medication Sig   • ammonium lactate (LAC-HYDRIN) 12 % cream Apply 1 application topically   • atenolol (TENORMIN) 100 mg tablet Take 100 mg by mouth   • azelastine (ASTELIN) 0 1 % nasal spray 1-2 sprays into each nostril 2 (two) times a day   • cetirizine (ZyrTEC) 10 MG chewable tablet Chew 1 tablet (10 mg total) daily   • Cetirizine HCl (Zerviate) 0 24 % SOLN Apply 1 drop to eye   • cyanocobalamin (VITAMIN B-12) 1000 MCG tablet Take 1,000 mcg by mouth daily   • ergocalciferol (ERGOCALCIFEROL) 1 25 MG (08555 UT) capsule Take 1 capsule by mouth once a week   • escitalopram (LEXAPRO) 10 mg tablet Take by mouth   • estrogens, conjugated (Premarin) vaginal cream APPLY A PEA SIZE AMOUNT OF THE ESTROGEN CREAM TO THE OPENING OF THE VAGINA EVERY NIGHT FOR 2 WEEKS THEN THREE TIMES A NIGHT   • FLUoxetine (PROzac) 20 mg capsule Take 20 mg by mouth daily   • gabapentin (NEURONTIN) 300 mg capsule Take 300 mg by mouth   • glimepiride (AMARYL) 1 mg tablet Take 1 mg by mouth daily   • glucose blood test strip Use Three times a day   • hydrOXYzine HCL (ATARAX) 25 mg tablet Take 25 mg by mouth Three times a day   • insulin glargine (Lantus SoloStar) 100 units/mL injection pen Inject 18u sc daily   • Insulin Glargine-Lixisenatide (Soliqua) 100 units-33 mcg/mL injection pen Inject 22 Units under the skin   • Insulin Pen Needle 32G X 6 MM MISC Use qith solostar pen daily   • Jardiance 25 MG TABS    • latanoprost (XALATAN) 0 005 % ophthalmic solution Apply to eye   • latanoprost (XALATAN) 0 005 % ophthalmic solution Apply to eye   • lidocaine (XYLOCAINE) 5 % ointment Apply topically as needed for mild pain   • lisinopril-hydrochlorothiazide (PRINZIDE,ZESTORETIC) 20-12 5 MG per tablet Take 1 tablet by mouth daily   • magnesium Oxide (MAG-OX) 400 mg TABS    • metFORMIN (GLUCOPHAGE-XR) 750 mg 24 hr tablet Take 750 mg by mouth   • methylPREDNISolone 4 MG tablet therapy pack Use as directed on package   • Mirabegron ER (Myrbetriq) 50 MG TB24 Take 50 mg by mouth daily   • mirtazapine (REMERON) 45 MG tablet    • montelukast (SINGULAIR) 10 mg tablet Take 10 mg by mouth daily   • olopatadine (PATANOL) 0 1 % ophthalmic solution Apply 1 drop to eye 2 (two) times a day   • polyethylene glycol-propylene glycol (Systane) 0 4-0 3 % Apply 1 drop to eye "Three times a day   • sertraline (ZOLOFT) 25 mg tablet    • simvastatin (ZOCOR) 10 mg tablet Take 10 mg by mouth   • solifenacin (VESICARE) 10 MG tablet Take by mouth   • triamcinolone (KENALOG) 0 1 % cream Apply topically 2 (two) times a day       Objective     /70 (BP Location: Left arm, Patient Position: Sitting, Cuff Size: Standard)   Pulse 77   Temp 97 8 °F (36 6 °C) (Temporal)   Resp 18   Ht 5' 4\" (1 626 m)   Wt 69 8 kg (153 lb 12 8 oz)   SpO2 95%   BMI 26 40 kg/m²     Physical Exam  Vitals and nursing note reviewed  Constitutional:       Appearance: She is well-developed  HENT:      Head: Normocephalic  Right Ear: External ear normal       Left Ear: External ear normal       Nose: Nose normal    Eyes:      Conjunctiva/sclera: Conjunctivae normal       Pupils: Pupils are equal, round, and reactive to light  Neck:      Thyroid: No thyromegaly  Cardiovascular:      Rate and Rhythm: Normal rate and regular rhythm  Heart sounds: Normal heart sounds  Pulmonary:      Effort: Pulmonary effort is normal       Breath sounds: Normal breath sounds  Abdominal:      Palpations: Abdomen is soft  Tenderness: There is no abdominal tenderness  There is no guarding or rebound  Musculoskeletal:         General: Normal range of motion  Cervical back: Normal range of motion and neck supple  Skin:     General: Skin is dry  Neurological:      Mental Status: She is alert and oriented to person, place, and time  Deep Tendon Reflexes: Reflexes are normal and symmetric         Rosa Avila MD  "

## 2023-07-27 ENCOUNTER — TELEPHONE (OUTPATIENT)
Dept: FAMILY MEDICINE CLINIC | Facility: CLINIC | Age: 73
End: 2023-07-27

## 2023-07-27 NOTE — TELEPHONE ENCOUNTER
Pt call asking if you can please give her another referral for an allergist that speaks Palauan, the one you reefer her to does not speaks Palauan.

## 2023-08-02 ENCOUNTER — PATIENT OUTREACH (OUTPATIENT)
Dept: FAMILY MEDICINE CLINIC | Facility: CLINIC | Age: 73
End: 2023-08-02

## 2023-08-02 DIAGNOSIS — Z78.9 NEEDS ASSISTANCE WITH COMMUNITY RESOURCES: Primary | ICD-10-CM
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
## 2023-08-02 NOTE — PROGRESS NOTES
[facility] received a new referral on 03/24/2023 in regard to pt with concerns with mild dementia/anxiety. Per chart review, pt reports having prior home care services that were discontinued. Pt would like to re - institute home care services. [facility] attempted to reach pt today and was unable. A message was left in Thai to please return [facility] call. [facility] will remain available.

## 2023-08-03 ENCOUNTER — PATIENT OUTREACH (OUTPATIENT)
Dept: FAMILY MEDICINE CLINIC | Facility: CLINIC | Age: 73
End: 2023-08-03
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
## 2023-08-09 ENCOUNTER — PATIENT OUTREACH (OUTPATIENT)
Dept: FAMILY MEDICINE CLINIC | Facility: CLINIC | Age: 73
End: 2023-08-09

## 2023-08-09 NOTE — PROGRESS NOTES
Outgoing Calls:  8/9/2023    Chart reviewed. [address] [place] [address] received a referral informing pt would like t apply for waiver services. [facility] called pt's main contact, Tierney, and left VM. [facility]OC called pt's other daughter, Juvencio Mackey and introduced self and role. [facility] assessment was completed. [facility] explained process of applying for waiver services. Juvencio Mackey requested [address] [place] [address] call pt to assist in scheduling waiver assessment with [place] [facility]. [address] [place] [address] then called pt and introduced self/role and reason for calling. Pt expressed understanding and agreed to call [place] [facility]. [address] [place] [address] called [place] [facility] with pt and were informed pt had waiver services since 10/26/2020 however ended on 6/22/2022 due to pt leaving the country. Pt was then offered to complete a renewal with [facility] on 2/23/23 and was informed recertification needed to be completed by 3/25/2023 to complete waiver. Pt did not compete this. We were instructed to call [facility] to request case is reopened. [address] [place] [address] facilitated a three way call to [address] [place] [address]. We were placed on hold for 58 minutes and informed pt will need to complete a [place] 600L to reapply for waiver services through [address] [place] [address]. Pt agreed to meet with [address] [place] [address] next week and complete.      Next outreach is scheduled for 8/14/2023 at [facility] at [facility].

## 2023-08-14 ENCOUNTER — PATIENT OUTREACH (OUTPATIENT)
Dept: FAMILY MEDICINE CLINIC | Facility: CLINIC | Age: 73
End: 2023-08-14

## 2023-08-14 NOTE — PROGRESS NOTES
Outgoing Call:  8/14/2023    [facility] called pt to reschedule her missed appointment with [facility]. Pt informs she was unable to meet with [facility] and left VM this morning with . [facility] informed she received message. Pt agreed to meet later this week.      Next outreach is scheduled for 8/16/2023 at [facility] at [facility].

## 2023-08-16 ENCOUNTER — PATIENT OUTREACH (OUTPATIENT)
Dept: FAMILY MEDICINE CLINIC | Facility: CLINIC | Age: 73
End: 2023-08-16

## 2023-08-16 NOTE — PROGRESS NOTES
In Person:  8/16/2023    [facility] with pt and her daughter at [facility] to complete a 749T application for waiver services. Pt asked [facility] how long process will take for waiver to be reinstated. [facility] informed pt process can take up to 2 months to be fully approved if she is still eligible. Pt informs she is leaving to [place] with her daughter sometime in the next two months and will not be returning until end of April. [facility] informed pt will not be able to receive services while away in [place]. Pt expressed understanding. Pt states she likes to stay several months through out the year in [place] and remaining months in [place]. [facility] informed pt referral will be closed as she will not be applying for waiver services. Pt thanked [facility] for her time. No further outreach is scheduled.

## 2023-09-06 ENCOUNTER — OFFICE VISIT (OUTPATIENT)
Dept: FAMILY MEDICINE CLINIC | Facility: CLINIC | Age: 73
End: 2023-09-06

## 2023-09-06 VITALS
BODY MASS INDEX: 26.67 KG/M2 | WEIGHT: 156.2 LBS | TEMPERATURE: 97.8 F | SYSTOLIC BLOOD PRESSURE: 130 MMHG | HEART RATE: 80 BPM | RESPIRATION RATE: 15 BRPM | HEIGHT: 64 IN | DIASTOLIC BLOOD PRESSURE: 90 MMHG | OXYGEN SATURATION: 95 %

## 2023-09-06 DIAGNOSIS — Z79.4 TYPE 2 DIABETES MELLITUS WITH DIABETIC POLYNEUROPATHY, WITH LONG-TERM CURRENT USE OF INSULIN (HCC): ICD-10-CM

## 2023-09-06 DIAGNOSIS — R25.2 LEG CRAMPS: ICD-10-CM

## 2023-09-06 DIAGNOSIS — E11.42 TYPE 2 DIABETES MELLITUS WITH DIABETIC POLYNEUROPATHY, WITH LONG-TERM CURRENT USE OF INSULIN (HCC): ICD-10-CM

## 2023-09-06 DIAGNOSIS — Z11.59 NEED FOR HEPATITIS C SCREENING TEST: Primary | ICD-10-CM

## 2023-09-06 DIAGNOSIS — N95.2 VAGINAL ATROPHY: ICD-10-CM

## 2023-09-06 PROCEDURE — 99214 OFFICE O/P EST MOD 30 MIN: CPT | Performed by: FAMILY MEDICINE

## 2023-09-06 PROCEDURE — 3080F DIAST BP >= 90 MM HG: CPT | Performed by: FAMILY MEDICINE

## 2023-09-06 PROCEDURE — 3075F SYST BP GE 130 - 139MM HG: CPT | Performed by: FAMILY MEDICINE

## 2023-09-06 RX ORDER — CONJUGATED ESTROGENS 0.62 MG/G
0.5 CREAM VAGINAL DAILY
Qty: 30 G | Refills: 3 | Status: SHIPPED | OUTPATIENT
Start: 2023-09-06

## 2023-09-06 RX ORDER — CYCLOBENZAPRINE HCL 5 MG
5 TABLET ORAL
Qty: 90 TABLET | Refills: 0 | Status: SHIPPED | OUTPATIENT
Start: 2023-09-06

## 2023-09-06 NOTE — PROGRESS NOTES
Name: Twan Wilson      : 1950      MRN: [id number]  Encounter Provider: Donovan Edgar MD  Encounter Date: 2023   Encounter department: [address] [facility],[address]     1. Need for hepatitis C screening test  -     Hepatitis C Antibody; Future    2. Type 2 diabetes mellitus with diabetic polyneuropathy, with long-term current use of insulin ([address])  -     Ambulatory Referral to Ophthalmology; Future  -     Albumin / creatinine urine ratio; Future    3. Vaginal atrophy  Assessment & Plan:  Discussed that vaginal itching is probably multifactorial  Patient states she was previously prescribed Premarin  Also dicussed that Jardiance can also cause the symptoms  Suggested taking to her Endocrinology   Wash after every urination     Orders:  -     estrogens, conjugated (Premarin) vaginal cream; Insert 0.5 g into the vagina daily    4. Leg cramps  -     cyclobenzaprine (FLEXERIL) 5 mg tablet; Take 1 tablet (5 mg total) by mouth daily at bedtime           Subjective      75 yo  female with known DM followed by Endocrinology, complains of R hip and back pain. She is followed by Pain Management for pain and is going to PT. No red flags with back pain  Complains of nocturnal leg cramps   Also complains of vaginal itching, denies vaginal discharge     Review of Systems   Musculoskeletal: Positive for arthralgias, back pain and gait problem. All other systems reviewed and are negative.       Current Outpatient Medications on File Prior to Visit   Medication Sig   • ammonium lactate (LAC-HYDRIN) 12 % cream Apply 1 application topically   • atenolol (TENORMIN) 100 mg tablet Take 100 mg by mouth   • azelastine (ASTELIN) 0.1 % nasal spray 1-2 sprays into each nostril 2 (two) times a day   • cetirizine (ZyrTEC) 10 MG chewable tablet Chew 1 tablet (10 mg total) daily   • Cetirizine HCl (Zerviate) 0.24 % SOLN Apply 1 drop to eye   • cyanocobalamin (VITAMIN B-12) 1000 MCG tablet Take 1,000 mcg by mouth daily   • ergocalciferol (ERGOCALCIFEROL) 1.25 MG (88578 UT) capsule Take 1 capsule by mouth once a week   • escitalopram (LEXAPRO) 10 mg tablet Take by mouth   • FLUoxetine (PROzac) 20 mg capsule Take 20 mg by mouth daily   • gabapentin (NEURONTIN) 300 mg capsule Take 300 mg by mouth   • glimepiride (AMARYL) 1 mg tablet Take 1 mg by mouth daily   • glucose blood test strip Use Three times a day   • hydrOXYzine HCL (ATARAX) 25 mg tablet Take 25 mg by mouth Three times a day   • insulin glargine (Lantus SoloStar) 100 units/mL injection pen Inject 18u sc daily   • Insulin Glargine-Lixisenatide (Soliqua) 100 units-33 mcg/mL injection pen Inject 22 Units under the skin   • Insulin Pen Needle 32G X 6 MM MISC Use qith solostar pen daily   • Jardiance 25 MG TABS    • latanoprost (XALATAN) 0.005 % ophthalmic solution Apply to eye   • latanoprost (XALATAN) 0.005 % ophthalmic solution Apply to eye   • lidocaine (XYLOCAINE) 5 % ointment Apply topically as needed for mild pain   • lisinopril-hydrochlorothiazide (PRINZIDE,ZESTORETIC) 20-12.5 MG per tablet Take 1 tablet by mouth daily   • magnesium Oxide (MAG-OX) 400 mg TABS    • metFORMIN (GLUCOPHAGE-XR) 750 mg 24 hr tablet Take 750 mg by mouth   • methylPREDNISolone 4 MG tablet therapy pack Use as directed on package   • Mirabegron ER (Myrbetriq) 50 MG TB24 Take 50 mg by mouth daily   • mirtazapine (REMERON) 45 MG tablet    • montelukast (SINGULAIR) 10 mg tablet Take 10 mg by mouth daily   • olopatadine (PATANOL) 0.1 % ophthalmic solution Apply 1 drop to eye 2 (two) times a day   • polyethylene glycol-propylene glycol (Systane) 0.4-0.3 % Apply 1 drop to eye Three times a day   • sertraline (ZOLOFT) 25 mg tablet    • simvastatin (ZOCOR) 10 mg tablet Take 10 mg by mouth   • solifenacin (VESICARE) 10 MG tablet Take by mouth   • triamcinolone (KENALOG) 0.1 % cream Apply topically 2 (two) times a day   • [DISCONTINUED] estrogens, conjugated (Premarin) vaginal cream APPLY A PEA SIZE AMOUNT OF THE ESTROGEN CREAM TO THE OPENING OF THE VAGINA EVERY NIGHT FOR 2 WEEKS THEN THREE TIMES A NIGHT       Objective     /90 (BP Location: Right arm, Patient Position: Sitting, Cuff Size: Standard)   Pulse 80   Temp 97.8 °F (36.6 °C) (Temporal)   Resp 15   Ht 5' 4" (1.626 m)   Wt 70.9 kg (156 lb 3.2 oz)   SpO2 95%   BMI 26.81 kg/m²     Physical Exam  Vitals and nursing note reviewed. Constitutional:       Appearance: She is well-developed. HENT:      Head: Normocephalic. Right Ear: External ear normal.      Left Ear: External ear normal.      Nose: Nose normal.   Eyes:      Conjunctiva/sclera: Conjunctivae normal.      Pupils: Pupils are equal, round, and reactive to light. Neck:      Thyroid: No thyromegaly. Cardiovascular:      Rate and Rhythm: Normal rate and regular rhythm. Heart sounds: Normal heart sounds. Pulmonary:      Effort: Pulmonary effort is normal.      Breath sounds: Normal breath sounds. Abdominal:      Palpations: Abdomen is soft. Tenderness: There is no abdominal tenderness. There is no guarding or rebound. Musculoskeletal:         General: Tenderness present. Normal range of motion. Cervical back: Normal range of motion and neck supple. Skin:     General: Skin is dry. Neurological:      Mental Status: She is alert and oriented to person, place, and time. Deep Tendon Reflexes: Reflexes are normal and symmetric.        Percy Chinchilla MD

## 2023-09-27 ENCOUNTER — PATIENT OUTREACH (OUTPATIENT)
Dept: FAMILY MEDICINE CLINIC | Facility: CLINIC | Age: 73
End: 2023-09-27

## 2023-09-27 NOTE — PROGRESS NOTES
Per chart review this seems that CAROLYN Mckeon contacted Pt and placed a referral to [facility]. [facility] contacted Pt. [facility] met with pt and her daughter at [facility] to complete a 573G application for waiver services. Pt asked [facility] how long process will take for waiver to be reinstated. [facility] informed pt process can take up to 2 months to be fully approved if she is still eligible. Pt informs she is leaving to [place] with her daughter sometime in the next two months and will not be returning until end of April. [facility] informed pt will not be able to receive services while away in [place]. Pt expressed understanding. Pt states she likes to stay several months through out the year in [place] and remaining months in [place]. [facility] informed pt referral will be closed as she will not be applying for waiver services. After chart review CAROLYN CERVANTES will close this referral.     CAROLYN CERVANETS is closing this referral today since Pt will be in [place] and will not be returning until end of April. CAROLYN CERVANTES is remain available for further assistance as needed.

## 2023-12-19 DIAGNOSIS — I10 PRIMARY HYPERTENSION: Primary | ICD-10-CM

## 2023-12-21 RX ORDER — ATENOLOL 100 MG/1
100 TABLET ORAL DAILY
Qty: 90 TABLET | Refills: 0 | Status: SHIPPED | OUTPATIENT
Start: 2023-12-21

## 2024-01-26 ENCOUNTER — OFFICE VISIT (OUTPATIENT)
Dept: FAMILY MEDICINE CLINIC | Facility: CLINIC | Age: 74
End: 2024-01-26

## 2024-01-26 VITALS
HEART RATE: 73 BPM | WEIGHT: 162 LBS | HEIGHT: 64 IN | SYSTOLIC BLOOD PRESSURE: 122 MMHG | OXYGEN SATURATION: 90 % | RESPIRATION RATE: 16 BRPM | DIASTOLIC BLOOD PRESSURE: 64 MMHG | BODY MASS INDEX: 27.66 KG/M2 | TEMPERATURE: 97.5 F

## 2024-01-26 DIAGNOSIS — I10 PRIMARY HYPERTENSION: ICD-10-CM

## 2024-01-26 DIAGNOSIS — Z79.4 TYPE 2 DIABETES MELLITUS WITH DIABETIC POLYNEUROPATHY, WITH LONG-TERM CURRENT USE OF INSULIN (HCC): Primary | ICD-10-CM

## 2024-01-26 DIAGNOSIS — E11.42 TYPE 2 DIABETES MELLITUS WITH DIABETIC POLYNEUROPATHY, WITH LONG-TERM CURRENT USE OF INSULIN (HCC): Primary | ICD-10-CM

## 2024-01-26 DIAGNOSIS — R25.2 LEG CRAMPS: ICD-10-CM

## 2024-01-26 DIAGNOSIS — N89.8 VAGINAL ITCHING: ICD-10-CM

## 2024-01-26 LAB — SL AMB POCT HEMOGLOBIN AIC: 6.4 (ref ?–6.5)

## 2024-01-26 PROCEDURE — 99214 OFFICE O/P EST MOD 30 MIN: CPT | Performed by: FAMILY MEDICINE

## 2024-01-26 PROCEDURE — 3078F DIAST BP <80 MM HG: CPT | Performed by: FAMILY MEDICINE

## 2024-01-26 PROCEDURE — 83036 HEMOGLOBIN GLYCOSYLATED A1C: CPT | Performed by: FAMILY MEDICINE

## 2024-01-26 PROCEDURE — 3074F SYST BP LT 130 MM HG: CPT | Performed by: FAMILY MEDICINE

## 2024-01-26 RX ORDER — ATENOLOL 100 MG/1
100 TABLET ORAL DAILY
Qty: 90 TABLET | Refills: 1 | Status: SHIPPED | OUTPATIENT
Start: 2024-01-26

## 2024-01-26 RX ORDER — FLUCONAZOLE 150 MG/1
150 TABLET ORAL ONCE
Qty: 1 TABLET | Refills: 0 | Status: SHIPPED | OUTPATIENT
Start: 2024-01-26 | End: 2024-01-26

## 2024-01-26 RX ORDER — CYCLOBENZAPRINE HCL 5 MG
5 TABLET ORAL
Qty: 90 TABLET | Refills: 0 | Status: SHIPPED | OUTPATIENT
Start: 2024-01-26

## 2024-01-26 NOTE — PROGRESS NOTES
Assessment/Plan:    No problem-specific Assessment & Plan notes found for this encounter.       Diagnoses and all orders for this visit:    Type 2 diabetes mellitus with diabetic polyneuropathy, with long-term current use of insulin ([facility])  -     POCT hemoglobin A1c    Vaginal itching  Comments:  Most probably 2/2 Jardiance, but patient would not like to switch med. Use wet wipes after urinating or wash with water  Orders:  -     fluconazole (DIFLUCAN) 150 mg tablet; Take 1 tablet (150 mg total) by mouth once for 1 dose    Leg cramps  Comments:  Suggested 4 ounces of tonic water qHs  Orders:  -     cyclobenzaprine (FLEXERIL) 5 mg tablet; Take 1 tablet (5 mg total) by mouth daily at bedtime    Primary hypertension  -     atenolol (TENORMIN) 100 mg tablet; Take 1 tablet (100 mg total) by mouth daily          Subjective:      Patient ID: Lucie Ramirez is a 73 y.o. female.    74 yo female with DM followed by Endocrinology, here today with the following complains:  1- Vaginal itching  2- Leg cramps at night        The following portions of the patient's history were reviewed and updated as appropriate: She  has a past medical history of Breast cancer ([facility]), Dementia ([facility]), and Heart murmur.  She   Patient Active Problem List    Diagnosis Date Noted   • Vaginal atrophy 2023   • Environmental allergies 2023   • Chronic midline low back pain with bilateral sciatica 2023   • Type 2 diabetes mellitus, with long-term current use of insulin ([facility]) 2022   • Glaucoma 2022   • Other hyperlipidemia 2022   • Primary hypertension 2022   • Mild episode of recurrent major depressive disorder (HCC) 2022   • Encounter for medication review 2022   • Dementia ([facility]) 04/15/2022     She  has a past surgical history that includes Kidney surgery; Appendectomy;  section; Hysterectomy; Throat surgery; and Lumbar disc surgery.  Her family history includes Diabetes in her mother;  Hypertension in her mother; No Known Problems in her father.  She  reports that she has never smoked. She has never used smokeless tobacco. She reports that she does not drink alcohol and does not use drugs.  Current Outpatient Medications   Medication Sig Dispense Refill   • atenolol (TENORMIN) 100 mg tablet Take 1 tablet (100 mg total) by mouth daily 90 tablet 1   • cyclobenzaprine (FLEXERIL) 5 mg tablet Take 1 tablet (5 mg total) by mouth daily at bedtime 90 tablet 0   • ammonium lactate (LAC-HYDRIN) 12 % cream Apply 1 application topically     • azelastine (ASTELIN) 0.1 % nasal spray 1-2 sprays into each nostril 2 (two) times a day     • cetirizine (ZyrTEC) 10 MG chewable tablet Chew 1 tablet (10 mg total) daily 30 tablet 2   • Cetirizine HCl (Zerviate) 0.24 % SOLN Apply 1 drop to eye     • cyanocobalamin (VITAMIN B-12) 1000 MCG tablet Take 1,000 mcg by mouth daily     • ergocalciferol (ERGOCALCIFEROL) 1.25 MG (77068 UT) capsule Take 1 capsule by mouth once a week     • escitalopram (LEXAPRO) 10 mg tablet Take by mouth     • estrogens, conjugated (Premarin) vaginal cream Insert 0.5 g into the vagina daily 30 g 3   • FLUoxetine (PROzac) 20 mg capsule Take 20 mg by mouth daily     • gabapentin (NEURONTIN) 300 mg capsule Take 300 mg by mouth     • glimepiride (AMARYL) 1 mg tablet Take 1 mg by mouth daily     • glucose blood test strip Use Three times a day     • hydrOXYzine HCL (ATARAX) 25 mg tablet Take 25 mg by mouth Three times a day     • insulin glargine (Lantus SoloStar) 100 units/mL injection pen Inject 18u sc daily     • Insulin Glargine-Lixisenatide (Soliqua) 100 units-33 mcg/mL injection pen Inject 22 Units under the skin     • Insulin Pen Needle 32G X 6 MM MISC Use qith solostar pen daily     • Jardiance 25 MG TABS      • latanoprost (XALATAN) 0.005 % ophthalmic solution Apply to eye     • latanoprost (XALATAN) 0.005 % ophthalmic solution Apply to eye     • lidocaine (XYLOCAINE) 5 % ointment Apply  topically as needed for mild pain 35.44 g 0   • lisinopril-hydrochlorothiazide (PRINZIDE,ZESTORETIC) 20-12.5 MG per tablet Take 1 tablet by mouth daily     • magnesium Oxide (MAG-OX) 400 mg TABS      • metFORMIN (GLUCOPHAGE-XR) 750 mg 24 hr tablet Take 750 mg by mouth     • methylPREDNISolone 4 MG tablet therapy pack Use as directed on package 21 each 0   • Mirabegron ER (Myrbetriq) 50 MG TB24 Take 50 mg by mouth daily     • mirtazapine (REMERON) 45 MG tablet      • montelukast (SINGULAIR) 10 mg tablet Take 10 mg by mouth daily     • olopatadine (PATANOL) 0.1 % ophthalmic solution Apply 1 drop to eye 2 (two) times a day     • polyethylene glycol-propylene glycol (Systane) 0.4-0.3 % Apply 1 drop to eye Three times a day     • sertraline (ZOLOFT) 25 mg tablet      • simvastatin (ZOCOR) 10 mg tablet Take 10 mg by mouth     • solifenacin (VESICARE) 10 MG tablet Take by mouth     • triamcinolone (KENALOG) 0.1 % cream Apply topically 2 (two) times a day 30 g 0     No current facility-administered medications for this visit.     Current Outpatient Medications on File Prior to Visit   Medication Sig   • ammonium lactate (LAC-HYDRIN) 12 % cream Apply 1 application topically   • azelastine (ASTELIN) 0.1 % nasal spray 1-2 sprays into each nostril 2 (two) times a day   • cetirizine (ZyrTEC) 10 MG chewable tablet Chew 1 tablet (10 mg total) daily   • Cetirizine HCl (Zerviate) 0.24 % SOLN Apply 1 drop to eye   • cyanocobalamin (VITAMIN B-12) 1000 MCG tablet Take 1,000 mcg by mouth daily   • ergocalciferol (ERGOCALCIFEROL) 1.25 MG (30187 UT) capsule Take 1 capsule by mouth once a week   • escitalopram (LEXAPRO) 10 mg tablet Take by mouth   • estrogens, conjugated (Premarin) vaginal cream Insert 0.5 g into the vagina daily   • FLUoxetine (PROzac) 20 mg capsule Take 20 mg by mouth daily   • gabapentin (NEURONTIN) 300 mg capsule Take 300 mg by mouth   • glimepiride (AMARYL) 1 mg tablet Take 1 mg by mouth daily   • glucose blood test  strip Use Three times a day   • hydrOXYzine HCL (ATARAX) 25 mg tablet Take 25 mg by mouth Three times a day   • insulin glargine (Lantus SoloStar) 100 units/mL injection pen Inject 18u sc daily   • Insulin Glargine-Lixisenatide (Soliqua) 100 units-33 mcg/mL injection pen Inject 22 Units under the skin   • Insulin Pen Needle 32G X 6 MM MISC Use qith solostar pen daily   • Jardiance 25 MG TABS    • latanoprost (XALATAN) 0.005 % ophthalmic solution Apply to eye   • latanoprost (XALATAN) 0.005 % ophthalmic solution Apply to eye   • lidocaine (XYLOCAINE) 5 % ointment Apply topically as needed for mild pain   • lisinopril-hydrochlorothiazide (PRINZIDE,ZESTORETIC) 20-12.5 MG per tablet Take 1 tablet by mouth daily   • magnesium Oxide (MAG-OX) 400 mg TABS    • metFORMIN (GLUCOPHAGE-XR) 750 mg 24 hr tablet Take 750 mg by mouth   • methylPREDNISolone 4 MG tablet therapy pack Use as directed on package   • Mirabegron ER (Myrbetriq) 50 MG TB24 Take 50 mg by mouth daily   • mirtazapine (REMERON) 45 MG tablet    • montelukast (SINGULAIR) 10 mg tablet Take 10 mg by mouth daily   • olopatadine (PATANOL) 0.1 % ophthalmic solution Apply 1 drop to eye 2 (two) times a day   • polyethylene glycol-propylene glycol (Systane) 0.4-0.3 % Apply 1 drop to eye Three times a day   • sertraline (ZOLOFT) 25 mg tablet    • simvastatin (ZOCOR) 10 mg tablet Take 10 mg by mouth   • solifenacin (VESICARE) 10 MG tablet Take by mouth   • triamcinolone (KENALOG) 0.1 % cream Apply topically 2 (two) times a day     No current facility-administered medications on file prior to visit.     She is allergic to aspirin, diphenhydramine, shellfish allergy - food allergy, and ibuprofen..    Review of Systems   Genitourinary:  Negative for vaginal discharge.        As per HPI   All other systems reviewed and are negative.        Objective:      /64 (BP Location: Right arm, Patient Position: Sitting, Cuff Size: Standard)   Pulse 73   Temp 97.5 °F (36.4 °C)  "(Temporal)   Resp 16   Ht 5' 4\" (1.626 m)   Wt 73.5 kg (162 lb)   SpO2 90%   BMI 27.81 kg/m²          Physical Exam  Vitals and nursing note reviewed.   Constitutional:       Appearance: She is well-developed.   HENT:      Head: Normocephalic.      Right Ear: External ear normal.      Left Ear: External ear normal.      Nose: Nose normal.   Eyes:      Conjunctiva/sclera: Conjunctivae normal.      Pupils: Pupils are equal, round, and reactive to light.   Neck:      Thyroid: No thyromegaly.   Cardiovascular:      Rate and Rhythm: Normal rate and regular rhythm.      Heart sounds: Normal heart sounds.   Pulmonary:      Effort: Pulmonary effort is normal.      Breath sounds: Normal breath sounds.   Abdominal:      Palpations: Abdomen is soft.      Tenderness: There is no abdominal tenderness. There is no guarding or rebound.   Musculoskeletal:         General: Normal range of motion.      Cervical back: Normal range of motion and neck supple.   Skin:     General: Skin is dry.   Neurological:      Mental Status: She is alert and oriented to person, place, and time.      Deep Tendon Reflexes: Reflexes are normal and symmetric.           "

## 2024-01-29 DIAGNOSIS — N95.2 VAGINAL ATROPHY: ICD-10-CM

## 2024-01-30 RX ORDER — CONJUGATED ESTROGENS 0.62 MG/G
CREAM VAGINAL
Qty: 30 G | Refills: 2 | Status: SHIPPED | OUTPATIENT
Start: 2024-01-30

## 2024-02-28 ENCOUNTER — PATIENT OUTREACH (OUTPATIENT)
Dept: FAMILY MEDICINE CLINIC | Facility: CLINIC | Age: 74
End: 2024-02-28

## 2024-02-28 NOTE — PROGRESS NOTES
Incoming call.     CAROLYN CERVANTES did receive a call from Pt. Pt stated that she came back from [place] and wants to start the Waiver Program again. CAROLYN CERVANTES informed Pt that per chart review this seems that [facility] Brennan Banerjee assisted her last year 2023 and closed the referral since Pt informed that she will stay in [place] until April 2024.     Pt stated that the information above it's correct, however, she wants to apply for Waiver Program since won't be back to [place] at this time. CAROLYN CERVANTES informed Pt that CAROLYN CERVANTES will talk with [facility]OC in regard the referral mentioned above and will reach out Pt to assist her as needed. Pt seems understanding and thankful for the CAROLYN CERVANTES support.    CAROLYN CERVANTES is remain available for further assistance as needed.

## 2024-03-01 ENCOUNTER — PATIENT OUTREACH (OUTPATIENT)
Dept: FAMILY MEDICINE CLINIC | Facility: CLINIC | Age: 74
End: 2024-03-01

## 2024-03-01 DIAGNOSIS — Z59.9 INAVAILABILITY OF COMMUNITY RESOURCES: Primary | ICD-10-CM

## 2024-03-01 DIAGNOSIS — Z78.9 NEEDS ASSISTANCE WITH COMMUNITY RESOURCES: ICD-10-CM

## 2024-03-01 SDOH — ECONOMIC STABILITY - INCOME SECURITY: PROBLEM RELATED TO HOUSING AND ECONOMIC CIRCUMSTANCES, UNSPECIFIED: Z59.9

## 2024-03-01 NOTE — PROGRESS NOTES
CAROLYN CERVANTES and GWYN Banerjee discussed about Pt's case during huddle meeting today. CAROLYN CERVANTES informed [facility] that Pt contacted CAROLYN CERVANTES and expressed that she is back to [place] and would like to apply for Waiver Program. GWYN and CAROLYN CERVANTES both agreed to assist Pt with applying for the Program above.     CAROLYN CERVANTES placed a new referral to [facility] and assigned Pt to herself as well.  CAROLYN CERVANTES is remain available for further assistance as needed.  CAROLYN CERVANTES will continue to follow-up.

## 2024-03-04 ENCOUNTER — CONSULT (OUTPATIENT)
Dept: FAMILY MEDICINE CLINIC | Facility: CLINIC | Age: 74
End: 2024-03-04

## 2024-03-04 VITALS
TEMPERATURE: 97 F | RESPIRATION RATE: 16 BRPM | BODY MASS INDEX: 27.66 KG/M2 | DIASTOLIC BLOOD PRESSURE: 80 MMHG | WEIGHT: 162 LBS | OXYGEN SATURATION: 90 % | SYSTOLIC BLOOD PRESSURE: 124 MMHG | HEIGHT: 64 IN | HEART RATE: 80 BPM

## 2024-03-04 DIAGNOSIS — Z01.818 PREOPERATIVE EXAMINATION: ICD-10-CM

## 2024-03-04 DIAGNOSIS — Z23 ENCOUNTER FOR IMMUNIZATION: Primary | ICD-10-CM

## 2024-03-04 PROBLEM — G31.84 MILD COGNITIVE IMPAIRMENT WITH MEMORY LOSS: Status: ACTIVE | Noted: 2018-02-28

## 2024-03-04 PROBLEM — N25.81 HYPERPARATHYROIDISM DUE TO RENAL INSUFFICIENCY (HCC): Status: ACTIVE | Noted: 2023-04-19

## 2024-03-04 PROBLEM — N18.31 STAGE 3A CHRONIC KIDNEY DISEASE (HCC): Status: ACTIVE | Noted: 2019-10-22

## 2024-03-04 PROCEDURE — 3079F DIAST BP 80-89 MM HG: CPT | Performed by: FAMILY MEDICINE

## 2024-03-04 PROCEDURE — 3074F SYST BP LT 130 MM HG: CPT | Performed by: FAMILY MEDICINE

## 2024-03-04 PROCEDURE — 99213 OFFICE O/P EST LOW 20 MIN: CPT | Performed by: FAMILY MEDICINE

## 2024-03-04 NOTE — PROGRESS NOTES
"Subjective:     Lucie Ramirez is a 73 y.o. female who presents to the office today for a preoperative consultation at the request of surgeon Unknown, outside network (Patient does not remember name or dentist address.  She will bring form from dentist office)  who plans on performing Teeth extraction (7 total) on March 6, 2024. This consultation is requested for the specific conditions prompting preoperative evaluation (i.e. because of potential affect on operative risk): Diabetes type 2, HTN. Planned anesthesia: Patient unsure (awaiting Dentist office Name & Number to call, patient will bring copy to office, she cannot remember Dentist name or office address). The patient has the following known anesthesia issues:  none . Patients bleeding risk: no recent abnormal bleeding, no remote history of abnormal bleeding, and no use of Ca-channel blockers. Patient does not have objections to receiving blood products if needed.    The following portions of the patient's history were reviewed and updated as appropriate: allergies, current medications, past family history, past medical history, past social history, past surgical history, and problem list.    Review of Systems  Constitutional: negative  Ears, nose, mouth, throat, and face: negative  Respiratory: negative  Cardiovascular: negative  Gastrointestinal: negative  Genitourinary:negative  Hematologic/lymphatic: negative     Objective:     /80 (BP Location: Right arm, Patient Position: Sitting, Cuff Size: Standard)   Pulse 80   Temp (!) 97 °F (36.1 °C) (Temporal)   Resp 16   Ht 5' 4\" (1.626 m)   Wt 73.5 kg (162 lb)   SpO2 90%   BMI 27.81 kg/m²   General appearance: alert and oriented, in no acute distress  Eyes: conjunctivae/corneas clear. PERRL, EOM's intact. Fundi benign.  Throat: abnormal findings: dentition: poor and periodontitis  Lungs: clear to auscultation bilaterally  Heart: regular rate and rhythm, S1, S2 normal, no murmur, click, rub or " gallop  Abdomen: soft, non-tender; bowel sounds normal; no masses,  no organomegaly  Extremities: extremities normal, warm and well-perfused; no cyanosis, clubbing, or edema  Pulses: 2+ and symmetric  Skin: Skin color, texture, turgor normal. No rashes or lesions  Neurologic: Alert and oriented X 3, normal strength and tone. Normal symmetric reflexes. Normal coordination and gait    Predictors of intubation difficulty:   Morbid obesity? no   Anatomically abnormal facies? no    Lab Review   Lab Results   Component Value Date     (L) 04/19/2014    K 5.8 (H) 01/30/2024    K 5.6 (H) 10/21/2023    K 4.9 07/15/2023    CO2 28 01/30/2024    CO2 27 10/21/2023    CO2 27 07/15/2023    BUN 23 01/30/2024    BUN 22 10/21/2023    BUN 32 (H) 07/15/2023    CREATININE 1.22 (H) 01/30/2024    CREATININE 1.14 (H) 10/21/2023    CREATININE 1.31 (H) 07/15/2023    GLUCOSE 164 (H) 04/19/2014    CALCIUM 9.7 01/30/2024    CALCIUM 9.8 10/21/2023    CALCIUM 9.5 07/15/2023      Elevated Cr. Around baseline. H/o CKD stage 3a.    Assessment:     73 y.o. female with planned surgery as above.    Known risk factors for perioperative complications: Diabetes mellitus    Patient is able to climb up flight of stairs and perform 4 mets  Difficulty with intubation is not anticipated.    Cardiac Risk Estimation: Moderate cardiac risk, low risk procedure    Current medications which may produce withdrawal symptoms if withheld perioperatively: none     Patient is medically stable to undergo procedure described above. Please see your Doctor if any change to your medical condition prior to surgery.   Plan:     1. Preoperative workup as follows none.  2. Change in medication regimen before surgery: none, continue medication regimen including morning of surgery, with sip of water.  3. Prophylaxis for cardiac events with perioperative beta-blockers: not indicated.  4. Invasive hemodynamic monitoring perioperatively: at the discretion of anesthesiologist.  5.  Deep vein thrombosis prophylaxis postoperatively:regimen to be chosen by surgical team.  6. Surveillance for postoperative MI with ECG immediately postoperatively and on postoperative days 1 and 2 AND troponin levels 24 hours postoperatively and on day 4 or hospital discharge (whichever comes first): at the discretion of anesthesiologist.  7. Elevated Creatinine, maintain adequate hydration, follow up with PCP

## 2024-03-07 ENCOUNTER — PATIENT OUTREACH (OUTPATIENT)
Dept: FAMILY MEDICINE CLINIC | Facility: CLINIC | Age: 74
End: 2024-03-07

## 2024-03-07 NOTE — PROGRESS NOTES
Outgoing Call:  3/7/2024    [facility] received a referral from Clemencia LEON, informing Pt would like to reapply for waiver services. Chart reviewed. CMOC called Pt and introduced self/role, and reason for call. CHW assessment was completed and Pt agreed to services. Pt was in waiting room of dental office and states she will have seven teeth pulled today. CMOC informed she will need to speak with Pt to call [facility] with her and begin process of reapplying for waiver. Pt agreed she will be available tomorrow afternoon. States she sleeps in late. CMOC agreed to call in the afternoon.     Next outreach is scheduled for 3/8/2024.

## 2024-03-08 ENCOUNTER — PATIENT OUTREACH (OUTPATIENT)
Dept: FAMILY MEDICINE CLINIC | Facility: CLINIC | Age: 74
End: 2024-03-08

## 2024-03-08 NOTE — PROGRESS NOTES
Outgoing Calls:  3/8/2024    [facility]OC called Pt to begin process of scheduling evaluation for waiver services. Pt informed she is unable to complete today as she still is unable to speak much due to oral surgery completed yesterday. CMOC expressed understanding and agreed to call back next week.     OC called SHELIA HICKS in an attempt to schedule an evaluation on behalf of Pt as CMOC previously assisted Pt with applying in the past. [facility] was informed she is no longer listed as an authorized rep and will need to call back with Pt to open new application.     Next outreach is scheduled for 3/11/2024.

## 2024-03-11 ENCOUNTER — PATIENT OUTREACH (OUTPATIENT)
Dept: FAMILY MEDICINE CLINIC | Facility: CLINIC | Age: 74
End: 2024-03-11

## 2024-03-11 NOTE — PROGRESS NOTES
Outgoing Calls:  3/11/2024    [facility] called Pt to begin process of applying for waiver services. Pt agreed she felt well enough to call IEB. CMOC did facilitate a three way call to [place] [facility] and were informed Pt will need to complete an evaluation with IEB and this was scheduled for 3/18/24 between 1-3 PM with [place] FABIOLA Kidd, Estella [place], at Pt's home. [place] ID number is [id number]. [facility] was added as an authorized representative to check on status of Pt's waiver application. After call was completed, [facility] explained what to expect at upcoming waiver evaluation. Pt expressed understanding and thanked [facility] for the call. Pt also asked if her new PCP would call her back. She informs she has two missed calls from Dr. Durham and is unsure why she received the calls. Pt is concerned and has called S[facility]AR several times but unable to get through. [facility] to forward message to Dr. Durham.     Next outreach is scheduled for 3/19/2024.

## 2024-03-14 ENCOUNTER — TELEPHONE (OUTPATIENT)
Dept: FAMILY MEDICINE CLINIC | Facility: CLINIC | Age: 74
End: 2024-03-14

## 2024-03-14 NOTE — TELEPHONE ENCOUNTER
IEB FORM RECEIVED ON 3/14/24  GIVEN TO DR. DUMAS TO BE COMPLETED, SIGNED BY MD/DO (INCLUDE LISC. NUMBER), AND FAXED BACK IN 3 DAYS

## 2024-03-19 ENCOUNTER — PATIENT OUTREACH (OUTPATIENT)
Dept: FAMILY MEDICINE CLINIC | Facility: CLINIC | Age: 74
End: 2024-03-19

## 2024-03-19 NOTE — PROGRESS NOTES
Outgoing Call:  3/19/2024    Chart reviewed. Checked on status of waiver application. Status on IEB website states [facility] will need to assess Pt as well. IEB form completed by PCP and was faxed. CMOC called Pt and informed her she will receive a call before end of week from [facility] to schedule her second assessment. Pt expressed understanding and agreed to call CMOC once appointment is scheduled.     Next outreach is scheduled for 3/26/2024.

## 2024-03-26 ENCOUNTER — PATIENT OUTREACH (OUTPATIENT)
Dept: FAMILY MEDICINE CLINIC | Facility: CLINIC | Age: 74
End: 2024-03-26

## 2024-03-26 NOTE — PROGRESS NOTES
Outgoing Call:  3/26/2024    CMOC checked on status of Pt's waiver application via [place] [facility] website. Status states Pt is in need of an assessment with AAA. CMOC called Pt to confirm she has scheduled her assessment. Pt informed her assessment is scheduled for tomorrow at 3 PM. CMOC explained process moving forward if she is medically approved for waiver services. Pt expressed understanding. CMOC to f/u.     Next outreach is scheduled for 3/29/2024.

## 2024-03-29 ENCOUNTER — PATIENT OUTREACH (OUTPATIENT)
Dept: FAMILY MEDICINE CLINIC | Facility: CLINIC | Age: 74
End: 2024-03-29

## 2024-03-29 NOTE — PROGRESS NOTES
Outgoing Calls:  3/29/2024    CMOC checked on status of Pt's waiver application. [place] [facility] website states they do not have an active application. CMOC called SHELIA HICKS and discussed status. CMOC was informed Pt was found to to be clinically ineligible for waiver services. CMOC called Pt and updated her and also explained process of appealing denial. Pt informs she does not want to move forward with an appeal as she does not feel confident she will be approved. CMOC expressed understanding and informed Pt she should review denial letter and if she changes her mind about appealing denial she can call CMOC. Pt is aware this referral will be closed today.     No further outreach is scheduled.

## 2024-04-02 ENCOUNTER — PATIENT OUTREACH (OUTPATIENT)
Dept: FAMILY MEDICINE CLINIC | Facility: CLINIC | Age: 74
End: 2024-04-02

## 2024-04-02 NOTE — PROGRESS NOTES
Incoming Call:  4/2/2024    [facility] received a call from Pt informing she received a document from SHELIA HICKS which is supposed to be completed by PCP. CMOC informed Pt only form PCP would need to complete is an IEB form and this was completed on 3/14/24 by Dr. Gibbs. [facility] asked Pt to read form to so that CMOC can further assist. Pt read portion of letter however it was from [place] [facility] informing PCP did not properly complete IEB form and both ICD.10 codes and MD signature were missing. CMOC reviewed Pt's chart and informed PCP filled out form correctly an signature was also completed. [facility] reminded Pt she was denied waiver services due to not meeting medical criteria. Pt informed CMOC she will have her daughter send picture of letter via text. [facility] agreed to call Pt once received and explain further what letter is for.     No further outreach is scheduled at this time.

## 2024-04-18 ENCOUNTER — PATIENT OUTREACH (OUTPATIENT)
Dept: FAMILY MEDICINE CLINIC | Facility: CLINIC | Age: 74
End: 2024-04-18

## 2024-04-18 NOTE — PROGRESS NOTES
Incoming Call:  4/18/2024    [facility] received a call from Pt informing she received her letter from SHELIA PEREZ informing her of her denial for waiver services. Pt completed the appeal form with a friend and mailed it back to SHELIA PEREZB. CMOC informed Pt she will receive a notice via mail within a few weeks with a hearing date. Pt informed she requested a phone hearing. CMOC informed she will need to be sure she reads all of her [facility] mail to ensure she does not miss her hearing. Pt expressed understanding and stated she wanted to let CMOC know. [facility] thanked Pt for the update.     No further outreach os scheduled.

## 2024-04-29 ENCOUNTER — OFFICE VISIT (OUTPATIENT)
Dept: FAMILY MEDICINE CLINIC | Facility: CLINIC | Age: 74
End: 2024-04-29

## 2024-04-29 VITALS
SYSTOLIC BLOOD PRESSURE: 150 MMHG | TEMPERATURE: 97.7 F | WEIGHT: 155.4 LBS | DIASTOLIC BLOOD PRESSURE: 90 MMHG | BODY MASS INDEX: 26.67 KG/M2 | HEART RATE: 73 BPM

## 2024-04-29 DIAGNOSIS — Z78.0 POST-MENOPAUSAL: ICD-10-CM

## 2024-04-29 DIAGNOSIS — Z91.09 ENVIRONMENTAL ALLERGIES: ICD-10-CM

## 2024-04-29 DIAGNOSIS — Z76.89 ENCOUNTER TO ESTABLISH CARE WITH NEW DOCTOR: ICD-10-CM

## 2024-04-29 DIAGNOSIS — I10 PRIMARY HYPERTENSION: Primary | ICD-10-CM

## 2024-04-29 DIAGNOSIS — Z79.4 TYPE 2 DIABETES MELLITUS WITH DIABETIC POLYNEUROPATHY, WITH LONG-TERM CURRENT USE OF INSULIN (HCC): ICD-10-CM

## 2024-04-29 DIAGNOSIS — Z23 ENCOUNTER FOR IMMUNIZATION: ICD-10-CM

## 2024-04-29 DIAGNOSIS — E11.42 TYPE 2 DIABETES MELLITUS WITH DIABETIC POLYNEUROPATHY, WITH LONG-TERM CURRENT USE OF INSULIN (HCC): ICD-10-CM

## 2024-04-29 PROBLEM — Z79.899 ENCOUNTER FOR MEDICATION REVIEW: Status: RESOLVED | Noted: 2022-04-17 | Resolved: 2024-04-29

## 2024-04-29 PROCEDURE — 3077F SYST BP >= 140 MM HG: CPT | Performed by: FAMILY MEDICINE

## 2024-04-29 PROCEDURE — 3080F DIAST BP >= 90 MM HG: CPT | Performed by: FAMILY MEDICINE

## 2024-04-29 PROCEDURE — 90471 IMMUNIZATION ADMIN: CPT

## 2024-04-29 PROCEDURE — 99214 OFFICE O/P EST MOD 30 MIN: CPT | Performed by: FAMILY MEDICINE

## 2024-04-29 PROCEDURE — 90715 TDAP VACCINE 7 YRS/> IM: CPT

## 2024-04-29 RX ORDER — ATENOLOL 100 MG/1
100 TABLET ORAL DAILY
Qty: 90 TABLET | Refills: 1 | Status: SHIPPED | OUTPATIENT
Start: 2024-04-29

## 2024-04-29 RX ORDER — CETIRIZINE HYDROCHLORIDE 10 MG/1
10 TABLET, CHEWABLE ORAL DAILY
Qty: 30 TABLET | Refills: 2 | Status: SHIPPED | OUTPATIENT
Start: 2024-04-29 | End: 2024-07-28

## 2024-04-29 RX ORDER — GABAPENTIN 400 MG/1
400 CAPSULE ORAL 3 TIMES DAILY
Qty: 90 CAPSULE | Refills: 5 | Status: SHIPPED | OUTPATIENT
Start: 2024-04-29 | End: 2024-10-26

## 2024-04-29 NOTE — ASSESSMENT & PLAN NOTE
BP Readings from Last 3 Encounters:   04/29/24 (!) 181/81   03/04/24 124/80   01/26/24 122/64     Current medications:  Pt only taking atenolol at this time, and does not recall any other medication for Htn on her medication list    Requested pt to bring in all medications next visit for medication reconciliation.

## 2024-04-29 NOTE — ASSESSMENT & PLAN NOTE
Lab Results   Component Value Date    HGBA1C 6.6 (H) 01/30/2024       Follows with endocrinology- Dr. Read
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
## 2024-05-23 ENCOUNTER — PATIENT OUTREACH (OUTPATIENT)
Dept: FAMILY MEDICINE CLINIC | Facility: CLINIC | Age: 74
End: 2024-05-23

## 2024-05-23 NOTE — PROGRESS NOTES
Per chart review this seems that CMOC Brennan Banerjee assisted Pt in applying for Waiver Program. Noted that  CMOC called [place] [facility] and was informed Pt was found to be clinically ineligible for waiver services. CMOC called Pt and updated her and also explained process of appealing denial. Pt informs she does not want to move forward with an appeal as she does not feel confident she will be approved. Pt is aware CMOC will be close the referral.      After chart review CAROLYN CERVANTES did place a call to Pt with no response, detailed VM left requesting a return call. CAROLYN CERVANTES will attempt to reach out the Pt a later time.     Incoming call.  Addendum.     CAROLYN CERVANTES received a returned call from Pt. Pt stated that she appealed for Waiver and she is waiting for response. CAROLYN CERVANTES informed Pt that this referral will be closed today, however, she can contact the CAROLYN  for further assistance. Pt seems understanding and thankful for the CAROLYN CERVANTES support.     CAROLYN CERVANTES is remain available for further assistance as needed.

## 2024-05-29 ENCOUNTER — PATIENT OUTREACH (OUTPATIENT)
Dept: FAMILY MEDICINE CLINIC | Facility: CLINIC | Age: 74
End: 2024-05-29

## 2024-05-29 NOTE — PROGRESS NOTES
Incoming call.    CAROLYN CERVANTES did receive a call from Pt. Pt stated that she called CAROLYN CERVANTES since she wanted to inform that she received the letter to appeal for her case (Waiver). Pt expressed that she signed the letter and sent it back. CAROLYN CERVANTES explained Pt that she can contact CAROLYN CERVANTES for further support. Pt expressed thanked.    CAROLYN CERVANTES is remain available for further assistance as needed.

## 2024-06-13 ENCOUNTER — TELEPHONE (OUTPATIENT)
Dept: FAMILY MEDICINE CLINIC | Facility: CLINIC | Age: 74
End: 2024-06-13

## 2024-06-13 NOTE — TELEPHONE ENCOUNTER
IEB FORM RECEIVED ON 6/13/2024  GIVEN TO ABHILASH SHELTON TO BE COMPLETED, SIGNED BY MD/ (INCLUDE LISC. NUMBER), AND FAXED BACK IN 3 DAYS

## 2024-06-19 NOTE — TELEPHONE ENCOUNTER
FORM COMPLETED, SIGNED BY MD/ (INCLUDE LISC. NUMBER) FAXED ON 06/19/24 TO [facility] at [phone]. FAX CONFIRMATION RECEIVED. FORM GIVEN TO NE TO SCAN

## 2024-07-15 ENCOUNTER — PATIENT OUTREACH (OUTPATIENT)
Dept: FAMILY MEDICINE CLINIC | Facility: CLINIC | Age: 74
End: 2024-07-15

## 2024-07-15 NOTE — PROGRESS NOTES
Incoming Call:  7/15/2024    [facility] received a call from [place] [facility] rep asking if Pt completed her financial statements needed for waiver application. CMOC reviewed chart and noted Pt was closed out with CMOC in April. CMOC informed she should be taken off as authorized rep and he should follow up directly with Pt. IEB rep thanked CMOC for her time.    No further outreach is scheduled.

## 2024-07-23 ENCOUNTER — CONSULT (OUTPATIENT)
Dept: FAMILY MEDICINE CLINIC | Facility: CLINIC | Age: 74
End: 2024-07-23

## 2024-07-23 ENCOUNTER — TELEPHONE (OUTPATIENT)
Dept: FAMILY MEDICINE CLINIC | Facility: CLINIC | Age: 74
End: 2024-07-23

## 2024-07-23 ENCOUNTER — PATIENT OUTREACH (OUTPATIENT)
Dept: FAMILY MEDICINE CLINIC | Facility: CLINIC | Age: 74
End: 2024-07-23

## 2024-07-23 VITALS
RESPIRATION RATE: 14 BRPM | SYSTOLIC BLOOD PRESSURE: 126 MMHG | OXYGEN SATURATION: 94 % | DIASTOLIC BLOOD PRESSURE: 77 MMHG | WEIGHT: 153.2 LBS | HEART RATE: 71 BPM | BODY MASS INDEX: 26.15 KG/M2 | HEIGHT: 64 IN | TEMPERATURE: 97.2 F

## 2024-07-23 DIAGNOSIS — E11.42 TYPE 2 DIABETES MELLITUS WITH DIABETIC POLYNEUROPATHY, WITH LONG-TERM CURRENT USE OF INSULIN (HCC): ICD-10-CM

## 2024-07-23 DIAGNOSIS — N18.32 STAGE 3B CHRONIC KIDNEY DISEASE (HCC): Primary | ICD-10-CM

## 2024-07-23 DIAGNOSIS — Z79.4 TYPE 2 DIABETES MELLITUS WITH DIABETIC POLYNEUROPATHY, WITH LONG-TERM CURRENT USE OF INSULIN (HCC): ICD-10-CM

## 2024-07-23 PROCEDURE — 99214 OFFICE O/P EST MOD 30 MIN: CPT | Performed by: FAMILY MEDICINE

## 2024-07-23 PROCEDURE — 3078F DIAST BP <80 MM HG: CPT | Performed by: FAMILY MEDICINE

## 2024-07-23 PROCEDURE — 3074F SYST BP LT 130 MM HG: CPT | Performed by: FAMILY MEDICINE

## 2024-07-23 RX ORDER — BLOOD-GLUCOSE SENSOR
1 EACH MISCELLANEOUS
Qty: 6 EACH | Refills: 3 | Status: SHIPPED | OUTPATIENT
Start: 2024-07-23

## 2024-07-23 NOTE — TELEPHONE ENCOUNTER
[facility] orthopedics called and stated they need office visit notes and EKG from today's visit faxed to them. Patient is scheduled for surgery this Friday. Can you please sign office visit so I can fax, thank you!      Dr Wiggins, office phone number is  and fax .

## 2024-07-23 NOTE — PROGRESS NOTES
Presurgical Evaluation    Subjective:      Patient ID: Lucie Ramirez is a 74 y.o. female.    Chief Complaint   Patient presents with   • Pre-op Exam       HPI  Lucie Ramirez is a 74 y.o. female  has a past medical history of Breast cancer (HCC), Chronic midline low back pain with bilateral sciatica, Dementia (HCC), Environmental allergies, Glaucoma, Heart murmur, Hyperparathyroidism due to renal insufficiency (HCC), Malignant neoplasm of breast (female) (HCC), Mild cognitive impairment with memory loss, Mild episode of recurrent major depressive disorder (HCC), Primary hypertension, Stage 3a chronic kidney disease (HCC), and Type 2 diabetes mellitus, with long-term current use of insulin (HCC). who presented to the office today   For preop examination.  Pt states she is feeling well, has chronic lower back pain for which she will be having the surgery this week.    She denies any fever, chills, nausea, vomiting, chest pain on exertion or at rest, or shortness of breath.  She does feel fatigues and has a h/o of chronic insomnia.      The following portions of the patient's history were reviewed and updated as appropriate: allergies, current medications, past family history, past medical history, past social history, past surgical history, and problem list.    Procedure date: July 26, 2024    Surgeon:  Dr. Chemo Wiggins  Planned procedure:  L 2-5 LAMINECTOMY   Diagnosis for procedure:  Spinal stenosis with neurogenic claudication    Prior anesthesia: Yes   General; Complications:  None / Tolerated well    CAD History: None   NOTE: Patient should see Cardiology if time available before surgery, and if appropriate.    Pulmonary History: None    Renal history: CKD stage 3 - GFR 30-59    Diabetes History:  Type 2  Uncontrolled Hemoglobin A1c 7.4 7/2/2024     Neurological History: None     On Immunosuppressant meds/biologics: No      Review of Systems   Constitutional:  Positive for fatigue. Negative for chills and  fever.   HENT:  Negative for congestion, rhinorrhea and sore throat.    Respiratory:  Negative for cough and shortness of breath.    Cardiovascular:  Negative for chest pain.   Gastrointestinal:  Negative for diarrhea, nausea and vomiting.   Musculoskeletal:  Positive for back pain and gait problem.   Skin:  Negative for rash.   Neurological:  Positive for headaches. Negative for dizziness.   Psychiatric/Behavioral:  Positive for sleep disturbance.          Current Outpatient Medications   Medication Sig Dispense Refill   • Continuous Glucose Sensor (FreeStyle Sneha 3 Sensor) [facility] [place] 1 each every 14 (fourteen) days 6 each 3   • atenolol (TENORMIN) 100 mg tablet Take 1 tablet (100 mg total) by mouth daily 90 tablet 1   • azelastine (ASTELIN) 0.1 % nasal spray 1-2 sprays into each nostril 2 (two) times a day     • cetirizine (ZyrTEC) 10 MG chewable tablet Chew 1 tablet (10 mg total) daily 30 tablet 2   • Cetirizine HCl (Zerviate) 0.24 % SOLN Apply 1 drop to eye     • cyanocobalamin (VITAMIN B-12) 1000 MCG tablet Take 1,000 mcg by mouth daily     • DULoxetine (CYMBALTA) 30 mg delayed release capsule      • ergocalciferol (ERGOCALCIFEROL) 1.25 MG (76077 UT) capsule Take 1 capsule by mouth once a week     • gabapentin (NEURONTIN) 400 mg capsule Take 1 capsule (400 mg total) by mouth 3 (three) times a day 90 capsule 5   • glucose blood test strip Use Three times a day     • hydrOXYzine HCL (ATARAX) 25 mg tablet Take 25 mg by mouth Three times a day     • Insulin Glargine-Lixisenatide (Soliqua) 100 units-33 mcg/mL injection pen Inject 22 Units under the skin     • Insulin Pen Needle 32G X 6 MM MISC Use qith solostar pen daily     • Jardiance 25 MG TABS Take 1 tablet by mouth every morning     • latanoprost (XALATAN) 0.005 % ophthalmic solution Apply to eye     • latanoprost (XALATAN) 0.005 % ophthalmic solution Apply to eye     • lisinopril-hydrochlorothiazide (PRINZIDE,ZESTORETIC) 20-12.5 MG per tablet Take 1 tablet by  mouth daily     • magnesium Oxide (MAG-OX) 400 mg TABS      • metFORMIN (GLUCOPHAGE-XR) 750 mg 24 hr tablet Take 750 mg by mouth     • Mirabegron ER (Myrbetriq) 50 MG TB24 Take 50 mg by mouth daily     • mirtazapine (REMERON) 45 MG tablet      • montelukast (SINGULAIR) 10 mg tablet Take 10 mg by mouth daily     • olopatadine (PATANOL) 0.1 % ophthalmic solution Apply 1 drop to eye 2 (two) times a day     • polyethylene glycol-propylene glycol (Systane) 0.4-0.3 % Apply 1 drop to eye Three times a day     • Premarin vaginal cream INSERT 0.5 GRAMS INTO THE VAGINA DAILY 30 g 2   • Restasis 0.05 % ophthalmic emulsion      • rosuvastatin (CRESTOR) 10 MG tablet Take 10 mg by mouth     • solifenacin (VESICARE) 10 MG tablet Take by mouth     • traZODone (DESYREL) 50 mg tablet Take 50 mg by mouth daily at bedtime     • triamcinolone (KENALOG) 0.1 % cream Apply topically 2 (two) times a day 30 g 1   • zolpidem (AMBIEN) 10 mg tablet Take 10 mg by mouth daily at bedtime as needed       No current facility-administered medications for this visit.       Allergies on file:   Aspirin, Diphenhydramine, Shellfish allergy - food allergy, and Ibuprofen    Patient Active Problem List   Diagnosis   • Dementia ([facility])   • Type 2 diabetes mellitus, with long-term current use of insulin ([facility])   • Glaucoma   • Other hyperlipidemia   • Primary hypertension   • Mild episode of recurrent major depressive disorder ([facility])   • Environmental allergies   • Chronic midline low back pain with bilateral sciatica   • Vaginal atrophy   • Malignant neoplasm of breast (female) ([facility])   • Hyperparathyroidism due to renal insufficiency ([facility])   • Mild cognitive impairment with memory loss   • Stage 3a chronic kidney disease ([facility])        Past Medical History:   Diagnosis Date   • Breast cancer ([facility])    • Chronic midline low back pain with bilateral sciatica 06/22/2023   • Dementia ([facility])    • Environmental allergies 06/22/2023   • Glaucoma 04/17/2022   • Heart murmur  "   • Hyperparathyroidism due to renal insufficiency ([facility]) 2023   • Malignant neoplasm of breast (female) ([facility]) 2011   • Mild cognitive impairment with memory loss 2018    Last Assessment & Plan:    Formatting of this note might be different from the original.   Worsening   • Mild episode of recurrent major depressive disorder ([facility]) 2022   • Primary hypertension 2022   • Stage 3a chronic kidney disease ([facility]) 10/22/2019    Last Assessment & Plan:    Formatting of this note is different from the original.   Lab Results    Component Value Date     GFRE 40 (L) 2023     GFRE 40 (L) 2022     GFRE 55 (L) 2022     NGFRC 52 (L) 2022     NGFRC 45 (L) 2021     NGFRC 38 (L) 2021    Stable   • Type 2 diabetes mellitus, with long-term current use of insulin ([facility]) 2022       Past Surgical History:   Procedure Laterality Date   • APPENDECTOMY     •  SECTION     • HYSTERECTOMY     • KIDNEY SURGERY     • LUMBAR DISC SURGERY     • THROAT SURGERY         Family History   Problem Relation Age of Onset   • Hypertension Mother    • Diabetes Mother    • No Known Problems Father        Social History     Tobacco Use   • Smoking status: Never   • Smokeless tobacco: Never   Vaping Use   • Vaping status: Never Used   Substance Use Topics   • Alcohol use: Never   • Drug use: Never     Objective:    Vitals:    24 1506   BP: 126/77   BP Location: Right arm   Patient Position: Sitting   Cuff Size: Standard   Pulse: 71   Resp: 14   Temp: (!) 97.2 °F (36.2 °C)   TempSrc: Temporal   SpO2: 94%   Weight: 69.5 kg (153 lb 3.2 oz)   Height: 5' 4\" (1.626 m)        Physical Exam  Vitals and nursing note reviewed.   Constitutional:       Appearance: She is well-developed.   HENT:      Head: Normocephalic and atraumatic.      Right Ear: External ear normal.      Left Ear: External ear normal.      Nose: Nose normal.      Mouth/Throat:      Mouth: Mucous membranes are moist. "      Dentition: Has dentures.   Eyes:      Conjunctiva/sclera: Conjunctivae normal.   Cardiovascular:      Rate and Rhythm: Normal rate and regular rhythm.      Heart sounds: Murmur heard.   Pulmonary:      Effort: Pulmonary effort is normal. No respiratory distress.   Musculoskeletal:      Right lower leg: No edema.      Left lower leg: No edema.   Skin:     Findings: No rash.   Neurological:      Mental Status: She is alert and oriented to person, place, and time.   Psychiatric:         Mood and Affect: Mood normal.         Behavior: Behavior normal.           Preop labs/testing available and reviewed: yes    eGFRcr   Date Value Ref Range Status   2024 42 (L) >59 Final        INR   Date Value Ref Range Status   2024 1.0  Final     Comment:     Interpretation  Suggested INR ranges for various  clinical conditions:                                           INR  Ambulatory Surgery          <1.5  Coumadinized Patients             (DVT,PE,MI or A.Fib)     2.0-3.0  Mechanical Heart Valve   2.5-3.5  Cardiogenic Embolus      2.5-3.5       EKG yes- NSR at 70/min, with RBBB    Echo no    Stress test/cath no    PFT/Masoud no    Functional capacity: Eat, Dress, walk indoors:  1-3 Mets  RCRI  High Risk surgery?         1 Point  CAD History:         1 Point   MI; Positive Stress Test; CP due to Mi;  Nitrate Usage to control Angina; Pathologic Q wave on EKG  CHF Active:         1 Point   Pulm Edema; Paroxysmal Nocturnal Dyspnea;  Bibasilar Rales (crackles);S3; CHF on CXR  Cerebrovascular Disease (TIA or CVA):     1 Point  DM on Insulin:        1 Point  Serum Creat >2.0 mg/dl:       1 Point          Total Points: 2     Scorin: Class I, Very Low Risk (0.4%)     1: Class II, Low risk (0.9%)     2: Class III Moderate (6.6%)     3: Class IV High (>11%)      KEYUR Risk:  GFR:   eGFRcr   Date Value Ref Range Status   2024 42 (L) >59 Final         For PCP:  If GFR>60, Hold ACE/ARB/Diuretic on the day of surgery, and  "NSAIDS 10 days before.    If GFR<45, Consider PRE and POST op Nephrology Consult.    If 46 <GFR> 59 : Has Patient had KEYUR in last 6 Months? no   If YES: Preop Nephrology consult   If No:  Post Op Nephrology consult.     Assessment/Plan:    Patient is medically optimized (cleared) for the planned procedure.    Further testing/evaluation is not required.    Postop concerns: blood glucose levels, blood pressure    Problem List Items Addressed This Visit        Endocrine    Type 2 diabetes mellitus, with long-term current use of insulin ([facility])    Relevant Medications    Continuous Glucose Sensor (FreeStyle Sneha 3 Sensor) MISC    Other Relevant Orders    Ambulatory Referral to Endocrinology   Other Visit Diagnoses     Stage 3b chronic kidney disease (HCC)    -  Primary    Relevant Orders    Basic metabolic panel           Diagnoses and all orders for this visit:    Stage 3b chronic kidney disease (HCC)  -     Basic metabolic panel; Future    Type 2 diabetes mellitus with diabetic polyneuropathy, with long-term current use of insulin (HCC)  -     Ambulatory Referral to Endocrinology; Future  -     Continuous Glucose Sensor (FreeStyle Sneha 3 Sensor) MISC; Use 1 each every 14 (fourteen) days          No outpatient medications have been marked as taking for the 7/23/24 encounter (Consult) with Sophie Juan MD.        NOTE: Please use the above to review important meds for your specialty, the remainder \"per anesthesia Guidelines.\"    NOTE: Please place an Inbasket message for \"SOC\" pool for complicated patients.     "

## 2024-07-23 NOTE — PROGRESS NOTES
IB/ Outgoing Call:  7/23/2024    [facility] received an IB from Dr. Juan stating Pt has a question about her waiver application. [facility] called Pt and Pt informed she was medically approved for waiver services however has not heard anything back from [employer]. [facility] completed a status update via [place] [employer] website and status states Pt was medically approved however a financial review needs to take place. Pt then informed she forwarded all of her financial documents to [employer] two weeks ago. [facility] informed they can take 30-45 days to finalize application. [facility] also informed Pt she can call them at [phone] to check on status. Pt expressed understanding and thanked [facility] for the call.     No further outreach is scheduled.

## 2024-07-24 ENCOUNTER — OFFICE VISIT (OUTPATIENT)
Dept: FAMILY MEDICINE CLINIC | Facility: CLINIC | Age: 74
End: 2024-07-24

## 2024-07-24 ENCOUNTER — APPOINTMENT (OUTPATIENT)
Dept: LAB | Facility: HOSPITAL | Age: 74
End: 2024-07-24
Payer: COMMERCIAL

## 2024-07-24 DIAGNOSIS — N89.8 VAGINAL ITCHING: ICD-10-CM

## 2024-07-24 DIAGNOSIS — N18.32 STAGE 3B CHRONIC KIDNEY DISEASE (HCC): ICD-10-CM

## 2024-07-24 DIAGNOSIS — R79.9 ELEVATED BUN: ICD-10-CM

## 2024-07-24 DIAGNOSIS — Z79.899 ENCOUNTER FOR MEDICATION REVIEW: ICD-10-CM

## 2024-07-24 DIAGNOSIS — N18.31 STAGE 3A CHRONIC KIDNEY DISEASE (HCC): Primary | ICD-10-CM

## 2024-07-24 LAB
ANION GAP SERPL CALCULATED.3IONS-SCNC: 8 MMOL/L (ref 4–13)
BUN SERPL-MCNC: 19 MG/DL (ref 5–25)
CALCIUM SERPL-MCNC: 9.8 MG/DL (ref 8.4–10.2)
CHLORIDE SERPL-SCNC: 108 MMOL/L (ref 96–108)
CO2 SERPL-SCNC: 25 MMOL/L (ref 21–32)
CREAT SERPL-MCNC: 1.25 MG/DL (ref 0.6–1.3)
GFR SERPL CREATININE-BSD FRML MDRD: 41 ML/MIN/1.73SQ M
GLUCOSE P FAST SERPL-MCNC: 167 MG/DL (ref 65–99)
POTASSIUM SERPL-SCNC: 5.7 MMOL/L (ref 3.5–5.3)
SODIUM SERPL-SCNC: 141 MMOL/L (ref 135–147)

## 2024-07-24 PROCEDURE — 3074F SYST BP LT 130 MM HG: CPT | Performed by: FAMILY MEDICINE

## 2024-07-24 PROCEDURE — 99213 OFFICE O/P EST LOW 20 MIN: CPT | Performed by: FAMILY MEDICINE

## 2024-07-24 PROCEDURE — 3078F DIAST BP <80 MM HG: CPT | Performed by: FAMILY MEDICINE

## 2024-07-24 PROCEDURE — 36415 COLL VENOUS BLD VENIPUNCTURE: CPT

## 2024-07-24 PROCEDURE — 80048 BASIC METABOLIC PNL TOTAL CA: CPT

## 2024-07-24 RX ORDER — ZOLPIDEM TARTRATE 10 MG/1
10 TABLET ORAL
COMMUNITY
Start: 2024-06-26

## 2024-07-24 RX ORDER — DULOXETIN HYDROCHLORIDE 30 MG/1
CAPSULE, DELAYED RELEASE ORAL
COMMUNITY
Start: 2024-07-02

## 2024-07-24 RX ORDER — CYCLOSPORINE 0.5 MG/ML
EMULSION OPHTHALMIC
COMMUNITY
Start: 2024-07-09

## 2024-07-24 RX ORDER — EMPAGLIFLOZIN 25 MG/1
1 TABLET, FILM COATED ORAL EVERY MORNING
COMMUNITY
Start: 2024-07-11

## 2024-07-24 RX ORDER — ROSUVASTATIN CALCIUM 10 MG/1
10 TABLET, COATED ORAL
COMMUNITY
Start: 2024-07-11 | End: 2025-07-11

## 2024-07-24 RX ORDER — TRAZODONE HYDROCHLORIDE 50 MG/1
50 TABLET ORAL
COMMUNITY
Start: 2024-07-11 | End: 2024-10-10

## 2024-07-24 RX ORDER — TRIAMCINOLONE ACETONIDE 1 MG/G
CREAM TOPICAL 2 TIMES DAILY
Qty: 30 G | Refills: 1 | Status: SHIPPED | OUTPATIENT
Start: 2024-07-24

## 2024-07-24 NOTE — PROGRESS NOTES
Ambulatory Visit  Name: Lucie Ramirez      : 1950      MRN: [id number]  Encounter Provider: Sophie Juan MD  Encounter Date: 2024   [facility]    Assessment & Plan   1. Stage 3a chronic kidney disease (HCC)  Assessment & Plan:  Lab Results   Component Value Date    EGFR 41 2024    EGFR 42 (L) 2024    EGFR 68 2024    CREATININE 1.25 2024    CREATININE 1.31 (H) 2024    CREATININE 0.88 2024       Repeat BMP  Increased BUN and Creatine    2. Encounter for medication review  Assessment & Plan:  Patient brought in all medications  Medications reconciled with medication list in the chart  3. Vaginal itching  -     triamcinolone (KENALOG) 0.1 % cream; Apply topically 2 (two) times a day  4. Elevated BUN       History of Present Illness     HPI  Lucie Ramirez is a 74 y.o. female  who presented to the office today to return for medication reconciliation due to upcoming planned surgery on 2026.  Patient brought in all her medications and reconciled.    Pt was not able to repeat BMP yesterday and will go today to have blood work completed.    The following portions of the patient's history were reviewed and updated as appropriate: allergies, current medications, past family history, past medical history, past social history, past surgical history and problem list.    Review of Systems   Constitutional:  Positive for fatigue. Negative for chills and fever.   HENT:  Negative for congestion, rhinorrhea and sore throat.    Respiratory:  Negative for cough and shortness of breath.    Cardiovascular:  Negative for chest pain.   Gastrointestinal:  Negative for diarrhea, nausea and vomiting.   Musculoskeletal:  Positive for back pain and gait problem.   Skin:  Negative for rash.   Neurological:  Positive for headaches. Negative for dizziness.   Psychiatric/Behavioral:  Positive for sleep disturbance.         Objective     There were no vitals taken for this visit.    Physical Exam  Vitals and nursing note reviewed.   Constitutional:       General: She is not in acute distress.     Appearance: She is well-developed.   HENT:      Head: Normocephalic and atraumatic.   Eyes:      Conjunctiva/sclera: Conjunctivae normal.   Cardiovascular:      Rate and Rhythm: Normal rate and regular rhythm.   Pulmonary:      Effort: Pulmonary effort is normal. No respiratory distress.      Breath sounds: Normal breath sounds.   Abdominal:      Palpations: Abdomen is soft.      Tenderness: There is no abdominal tenderness.   Musculoskeletal:         General: No swelling.      Cervical back: Neck supple.   Skin:     General: Skin is warm and dry.      Capillary Refill: Capillary refill takes less than 2 seconds.   Neurological:      Mental Status: She is alert.   Psychiatric:         Mood and Affect: Mood normal.         Behavior: Behavior normal.       Administrative Statements

## 2024-07-25 NOTE — TELEPHONE ENCOUNTER
Good morning. My name is Kristi. I'm calling from [facility] regarding mutual patient Lucie Ramirez. Patient was in on 7/23 for clearance and although we are receiving it, there is an issue with receiving black pages. So I did get most of the pages. However, page 4/5 and eight have a huge black ink peter on them. I was hoping that you could just at least reface those and maybe they would come through. Number is [phone] to send the fax to. If you have any questions, please give me a call. My name is Kristi [phone], [phone]. Thank you so much

## 2024-07-26 DIAGNOSIS — N18.31 STAGE 3A CHRONIC KIDNEY DISEASE (HCC): Primary | ICD-10-CM

## 2024-07-26 NOTE — ASSESSMENT & PLAN NOTE
Lab Results   Component Value Date    EGFR 41 07/24/2024    EGFR 42 (L) 07/02/2024    EGFR 68 04/30/2024    CREATININE 1.25 07/24/2024    CREATININE 1.31 (H) 07/02/2024    CREATININE 0.88 04/30/2024       Repeat BMP  Increased BUN and Creatine

## 2024-08-05 ENCOUNTER — TELEPHONE (OUTPATIENT)
Dept: LAB | Facility: HOSPITAL | Age: 74
End: 2024-08-05

## 2024-08-09 ENCOUNTER — APPOINTMENT (OUTPATIENT)
Dept: LAB | Facility: HOSPITAL | Age: 74
End: 2024-08-09
Attending: FAMILY MEDICINE

## 2024-08-09 DIAGNOSIS — N18.31 STAGE 3A CHRONIC KIDNEY DISEASE (HCC): ICD-10-CM

## 2024-08-09 DIAGNOSIS — Z79.4 TYPE 2 DIABETES MELLITUS WITH DIABETIC POLYNEUROPATHY, WITH LONG-TERM CURRENT USE OF INSULIN (HCC): ICD-10-CM

## 2024-08-09 DIAGNOSIS — Z11.59 NEED FOR HEPATITIS C SCREENING TEST: ICD-10-CM

## 2024-08-09 DIAGNOSIS — E11.42 TYPE 2 DIABETES MELLITUS WITH DIABETIC POLYNEUROPATHY, WITH LONG-TERM CURRENT USE OF INSULIN (HCC): ICD-10-CM

## 2024-08-09 LAB
ANION GAP SERPL CALCULATED.3IONS-SCNC: 11 MMOL/L (ref 4–13)
BUN SERPL-MCNC: 24 MG/DL (ref 5–25)
CALCIUM SERPL-MCNC: 10.1 MG/DL (ref 8.4–10.2)
CHLORIDE SERPL-SCNC: 106 MMOL/L (ref 96–108)
CO2 SERPL-SCNC: 21 MMOL/L (ref 21–32)
CREAT SERPL-MCNC: 0.91 MG/DL (ref 0.6–1.3)
GFR SERPL CREATININE-BSD FRML MDRD: 61 ML/MIN/1.73SQ M
GLUCOSE SERPL-MCNC: 125 MG/DL (ref 65–140)
HCV AB SER QL: NORMAL
POTASSIUM SERPL-SCNC: 4.9 MMOL/L (ref 3.5–5.3)
SODIUM SERPL-SCNC: 138 MMOL/L (ref 135–147)

## 2024-08-09 PROCEDURE — 86803 HEPATITIS C AB TEST: CPT

## 2024-08-09 PROCEDURE — 80048 BASIC METABOLIC PNL TOTAL CA: CPT

## 2024-08-09 PROCEDURE — 36415 COLL VENOUS BLD VENIPUNCTURE: CPT

## 2024-08-14 ENCOUNTER — TELEPHONE (OUTPATIENT)
Dept: FAMILY MEDICINE CLINIC | Facility: CLINIC | Age: 74
End: 2024-08-14

## 2024-08-14 DIAGNOSIS — Z79.4 TYPE 2 DIABETES MELLITUS WITH DIABETIC POLYNEUROPATHY, WITH LONG-TERM CURRENT USE OF INSULIN (HCC): ICD-10-CM

## 2024-08-14 DIAGNOSIS — E11.42 TYPE 2 DIABETES MELLITUS WITH DIABETIC POLYNEUROPATHY, WITH LONG-TERM CURRENT USE OF INSULIN (HCC): ICD-10-CM

## 2024-08-14 NOTE — TELEPHONE ENCOUNTER
Pt needs refill of Insulin Glargine-Lixisenatide (Soliqua) 100 units-33 mcg/mL injection pen     Please advise, thank you

## 2024-08-15 ENCOUNTER — TELEPHONE (OUTPATIENT)
Dept: FAMILY MEDICINE CLINIC | Facility: CLINIC | Age: 74
End: 2024-08-15

## 2024-08-15 NOTE — TELEPHONE ENCOUNTER
[facility] called stating that they need to be sent via Fax the Medical record, Immunization report and last visit note of the pt to they can assist with a caregiver to patient.    Fax number: [phone]    Please advise, thank you

## 2024-08-20 ENCOUNTER — TELEPHONE (OUTPATIENT)
Dept: FAMILY MEDICINE CLINIC | Facility: CLINIC | Age: 74
End: 2024-08-20

## 2024-08-20 NOTE — TELEPHONE ENCOUNTER
PCP SIGNATURE NEEDED FOR [facility]  FORM RECEIVED VIA FAX AND PLACED IN PCP FOLDER TO BE DELIVERED AT ASSIGNED TIMES.    Order#: [id number]

## 2024-08-22 ENCOUNTER — TELEPHONE (OUTPATIENT)
Dept: FAMILY MEDICINE CLINIC | Facility: CLINIC | Age: 74
End: 2024-08-22

## 2024-08-22 DIAGNOSIS — Z79.4 TYPE 2 DIABETES MELLITUS WITH DIABETIC POLYNEUROPATHY, WITH LONG-TERM CURRENT USE OF INSULIN (HCC): Primary | ICD-10-CM

## 2024-08-22 DIAGNOSIS — E11.42 TYPE 2 DIABETES MELLITUS WITH DIABETIC POLYNEUROPATHY, WITH LONG-TERM CURRENT USE OF INSULIN (HCC): Primary | ICD-10-CM

## 2024-08-22 RX ORDER — INSULIN GLARGINE AND LIXISENATIDE 100; 33 U/ML; UG/ML
22 INJECTION, SOLUTION SUBCUTANEOUS DAILY
Qty: 3 ML | Refills: 5 | Status: SHIPPED | OUTPATIENT
Start: 2024-08-22 | End: 2024-08-23

## 2024-08-22 NOTE — TELEPHONE ENCOUNTER
Found a prior auth for soliqua faxed to printer    Pt appears to have [facility] but unclear    If she does, basal insulin (like lantus or toujeo) and a separate GLP1 (Victoza, ozempic, trulicity)    Can be prescribed and would be covered    FYI to PCP    Pharmacist Tracking Tool  Reason For Outreach: Embedded Pharmacist  Demographics:  Intervention Method: Phone  Type of Intervention: New  Topics Addressed: Other  Pharmacologic Interventions: Med Rec  Non-Pharmacologic Interventions: Cost  Time:  Direct Patient Care:  0  mins  Care Coordination:  5  mins  Recommendation Recipient: Provider  Outcome: Pending/ Follow-up Required    Sunny Mccarty, PharmD, BCACP  Ambulatory Care Clinical Pharmacist

## 2024-08-23 RX ORDER — INSULIN GLARGINE 100 [IU]/ML
22 INJECTION, SOLUTION SUBCUTANEOUS DAILY
Qty: 15 ML | Refills: 2 | Status: SHIPPED | OUTPATIENT
Start: 2024-08-23

## 2024-08-23 NOTE — TELEPHONE ENCOUNTER
Thank you Dr. Denis, I spoke with the pt and we decided to only start Lantus at this time, and then will reassess after her next HgA1c.

## 2024-08-26 NOTE — TELEPHONE ENCOUNTER
FAXED ON 08/23/24 TO [facility] at [phone]. FAX CONFIRMATION RECEIVED.

## 2024-09-11 ENCOUNTER — TELEPHONE (OUTPATIENT)
Dept: FAMILY MEDICINE CLINIC | Facility: CLINIC | Age: 74
End: 2024-09-11

## 2024-09-11 DIAGNOSIS — Z74.8 ASSISTANCE NEEDED WITH TRANSPORTATION: Primary | ICD-10-CM

## 2024-09-12 NOTE — TELEPHONE ENCOUNTER
Pt call office today asking for referral to help pt with transportation to appointment  and supermarket.   
Referral placed to [facility]
Skin normal color for race, warm, dry and intact. No evidence of rash.

## 2024-09-20 LAB
LEFT EYE DIABETIC RETINOPATHY: NORMAL
RIGHT EYE DIABETIC RETINOPATHY: NORMAL

## 2024-09-24 ENCOUNTER — OFFICE VISIT (OUTPATIENT)
Dept: FAMILY MEDICINE CLINIC | Facility: CLINIC | Age: 74
End: 2024-09-24

## 2024-09-24 VITALS
BODY MASS INDEX: 26.29 KG/M2 | SYSTOLIC BLOOD PRESSURE: 130 MMHG | TEMPERATURE: 97.6 F | DIASTOLIC BLOOD PRESSURE: 84 MMHG | HEIGHT: 64 IN | OXYGEN SATURATION: 98 % | HEART RATE: 57 BPM | WEIGHT: 154 LBS | RESPIRATION RATE: 16 BRPM

## 2024-09-24 DIAGNOSIS — M25.562 CHRONIC PAIN OF LEFT KNEE: ICD-10-CM

## 2024-09-24 DIAGNOSIS — Z91.09 ENVIRONMENTAL ALLERGIES: ICD-10-CM

## 2024-09-24 DIAGNOSIS — N32.81 OAB (OVERACTIVE BLADDER): ICD-10-CM

## 2024-09-24 DIAGNOSIS — I10 PRIMARY HYPERTENSION: Primary | ICD-10-CM

## 2024-09-24 DIAGNOSIS — E78.49 OTHER HYPERLIPIDEMIA: ICD-10-CM

## 2024-09-24 DIAGNOSIS — E11.42 TYPE 2 DIABETES MELLITUS WITH DIABETIC POLYNEUROPATHY, WITH LONG-TERM CURRENT USE OF INSULIN (HCC): ICD-10-CM

## 2024-09-24 DIAGNOSIS — Z79.4 TYPE 2 DIABETES MELLITUS WITH DIABETIC POLYNEUROPATHY, WITH LONG-TERM CURRENT USE OF INSULIN (HCC): ICD-10-CM

## 2024-09-24 DIAGNOSIS — G89.29 CHRONIC PAIN OF LEFT KNEE: ICD-10-CM

## 2024-09-24 DIAGNOSIS — Z74.8 ASSISTANCE NEEDED WITH TRANSPORTATION: ICD-10-CM

## 2024-09-24 PROCEDURE — 3075F SYST BP GE 130 - 139MM HG: CPT | Performed by: FAMILY MEDICINE

## 2024-09-24 PROCEDURE — 3079F DIAST BP 80-89 MM HG: CPT | Performed by: FAMILY MEDICINE

## 2024-09-24 PROCEDURE — 99214 OFFICE O/P EST MOD 30 MIN: CPT | Performed by: FAMILY MEDICINE

## 2024-09-24 RX ORDER — MIRABEGRON 50 MG/1
50 TABLET, EXTENDED RELEASE ORAL DAILY
Qty: 30 TABLET | Refills: 11 | Status: SHIPPED | OUTPATIENT
Start: 2024-09-24 | End: 2025-09-24

## 2024-09-24 RX ORDER — POLYETHYLENE GLYCOL 400 AND PROPYLENE GLYCOL 4; 3 MG/ML; MG/ML
1 SOLUTION/ DROPS OPHTHALMIC
Qty: 146 ML | Refills: 0 | Status: SHIPPED | OUTPATIENT
Start: 2024-09-24 | End: 2025-09-24

## 2024-09-24 RX ORDER — CETIRIZINE 2.4 MG/ML
1 SOLUTION/ DROPS OPHTHALMIC DAILY
Qty: 30 EACH | Refills: 5 | Status: SHIPPED | OUTPATIENT
Start: 2024-09-24

## 2024-09-24 NOTE — ASSESSMENT & PLAN NOTE
Component  Ref Range & Units 7/2/24 11:13 AM   Cholesterol  <200 mg/dL 128   Triglycerides  <150 mg/dL 101   Cholesterol, HDL, Direct  23 - 92 mg/dL 76   Cholesterol, Non-HDL  <160 mg/dL 52   LDL Cholesterol  <130 mg/dL 32   Comment: LDL Cholesterol was calculated using the Friedewald equation. Direct measurement of LDL is not indicated for this patient based on [facility]'s analytical algorithm for measurement of LDL Cholesterol.   Chol/HDL Ratio 1.7     Stable  Current medication: Simvastatin 10 mg daily

## 2024-09-24 NOTE — ASSESSMENT & PLAN NOTE
BP Readings from Last 3 Encounters:   09/24/24 130/84   07/23/24 126/77   04/29/24 150/90     Stable  Current medications: Atenolol 100 mg daily     Plan:  Pt brought in all medications this visit, she is not taking Lisinopril-HCTZ, so I will discontinue this from her medication list.  Continue atenolol 100 mg daily

## 2024-09-24 NOTE — PROGRESS NOTES
Ambulatory Visit  Name: Lucie Ramirez      : 1950      MRN: [id number]  Encounter Provider: Sophie Juan MD  Encounter Date: 2024   Encounter department: [facility]    Assessment & Plan  Primary hypertension  BP Readings from Last 3 Encounters:   24 130/84   24 126/77   24 150/90     Stable  Current medications: Atenolol 100 mg daily     Plan:  Pt brought in all medications this visit, she is not taking Lisinopril-HCTZ, so I will discontinue this from her medication list.  Continue atenolol 100 mg daily         Other hyperlipidemia    Component  Ref Range & Units 24 11:13 AM   Cholesterol  <200 mg/dL 128   Triglycerides  <150 mg/dL 101   Cholesterol, HDL, Direct  23 - 92 mg/dL 76   Cholesterol, Non-HDL  <160 mg/dL 52   LDL Cholesterol  <130 mg/dL 32   Comment: LDL Cholesterol was calculated using the Friedewald equation. Direct measurement of LDL is not indicated for this patient based on [facility]'s analytical algorithm for measurement of LDL Cholesterol.   Chol/HDL Ratio 1.7     Stable  Current medication: Simvastatin 10 mg daily           Type 2 diabetes mellitus with diabetic polyneuropathy, with long-term current use of insulin ([facility])    Lab Results   Component Value Date    HGBA1C 7.4 (H) 2024     Follows with endocrinology- Dr. Read       Chronic pain of left knee  Chronic left knee pain  Will obtain left knee x-ray  Referral to orthopedics at patient's request  Orders:    XR knee 3 vw left non injury; Future    Ambulatory Referral to Orthopedic Surgery; Future    Assistance needed with transportation    Orders:    Ambulatory Referral to Social Work Care Management Program; Future    Environmental allergies    Orders:    Cetirizine HCl (Zerviate) 0.24 % SOLN; Apply 1 drop to eye in the morning    polyethylene glycol-propylene glycol (Systane) 0.4-0.3 %; Apply 1 drop to eye every 3 (three) hours as needed (dry eyes)    OAB  (overactive bladder)    Orders:    Mirabegron ER (Myrbetriq) 50 MG TB24; Take 1 tablet (50 mg total) by mouth daily    BMI Counseling: Body mass index is 26.43 kg/m². The BMI is above normal. Nutrition recommendations include decreasing portion sizes, encouraging healthy choices of fruits and vegetables, decreasing fast food intake, consuming healthier snacks and limiting drinks that contain sugar. Exercise recommendations include moderate physical activity 150 minutes/week and strength training exercises. Rationale for BMI follow-up plan is due to patient being overweight or obese. Pt has PT scheduled    Depression Screening and Follow-up Plan: Patient's depression screening was positive with a PHQ-9 score of 20. Patient assessed for underlying major depression. Brief counseling provided and recommend additional follow-up/re-evaluation next office visit.           History of Present Illness       Lucie Ramirez is a 74 y.o. female  has a past medical history of Breast cancer (HCC), Chronic midline low back pain with bilateral sciatica, Dementia (HCC), Environmental allergies, Glaucoma, Heart murmur, Hyperparathyroidism due to renal insufficiency (HCC), Malignant neoplasm of breast (female) (HCC), Mild cognitive impairment with memory loss, Mild episode of recurrent major depressive disorder (HCC), Primary hypertension, Stage 3a chronic kidney disease (HCC), and Type 2 diabetes mellitus, with long-term current use of insulin (HCC). who presented to the office today for follow up of her chronic conditions.     Pt is s/p L2-L5 bilateral laminectomy on 7/26/2024, and pt states her sx have improved in the lower back, but the left hip and left knee are causing her pain.  The pain starts in the left ant and lateral hip and radiates to her left knee.  She has an appointment for physical therapy on 10/2/24.  Dm eye exam completed 9/20/24    Pt has a homecare giver who assits for 7 hours daily.  Pt is in need of  "transportation assitance.    Patient states she continues to feel depressed and fatigued.  Even after her spinal surgery she feels saddened because now she has pain in her left hip and left knee.      The following portions of the patient's history were reviewed and updated as appropriate: allergies, current medications, past family history, past medical history, past social history, past surgical history and problem list.    History obtained from : patient  Review of Systems   Constitutional:  Positive for fatigue. Negative for chills and fever.   HENT:  Negative for congestion, rhinorrhea and sore throat.    Respiratory:  Negative for cough and shortness of breath.    Cardiovascular:  Negative for chest pain.   Gastrointestinal:  Negative for diarrhea, nausea and vomiting.   Musculoskeletal:  Positive for back pain and gait problem.   Skin:  Negative for rash.   Neurological:  Positive for headaches. Negative for dizziness.   Psychiatric/Behavioral:  Positive for sleep disturbance.            Objective     /84 (BP Location: Right arm, Patient Position: Sitting, Cuff Size: Standard)   Pulse 57   Temp 97.6 °F (36.4 °C) (Temporal)   Resp 16   Ht 5' 4\" (1.626 m)   Wt 69.9 kg (154 lb)   SpO2 98%   BMI 26.43 kg/m²     Physical Exam  Vitals and nursing note reviewed.   Constitutional:       General: She is not in acute distress.     Appearance: She is well-developed.   HENT:      Head: Normocephalic and atraumatic.   Eyes:      Conjunctiva/sclera: Conjunctivae normal.   Cardiovascular:      Rate and Rhythm: Normal rate and regular rhythm.   Pulmonary:      Effort: Pulmonary effort is normal. No respiratory distress.   Abdominal:      Palpations: Abdomen is soft.      Tenderness: There is no abdominal tenderness.   Musculoskeletal:         General: No swelling.      Cervical back: Neck supple.   Skin:     General: Skin is warm and dry.      Capillary Refill: Capillary refill takes less than 2 seconds. "   Neurological:      Mental Status: She is alert and oriented to person, place, and time.   Psychiatric:         Mood and Affect: Mood normal.         Behavior: Behavior normal.

## 2024-09-24 NOTE — ASSESSMENT & PLAN NOTE
Lab Results   Component Value Date    HGBA1C 7.4 (H) 07/02/2024     Follows with endocrinology- Dr. Read

## 2024-09-24 NOTE — ASSESSMENT & PLAN NOTE
Orders:    Cetirizine HCl (Zerviate) 0.24 % SOLN; Apply 1 drop to eye in the morning    polyethylene glycol-propylene glycol (Systane) 0.4-0.3 %; Apply 1 drop to eye every 3 (three) hours as needed (dry eyes)     General

## 2024-09-25 ENCOUNTER — TELEPHONE (OUTPATIENT)
Dept: ADMINISTRATIVE | Facility: OTHER | Age: 74
End: 2024-09-25

## 2024-09-25 ENCOUNTER — PATIENT OUTREACH (OUTPATIENT)
Dept: FAMILY MEDICINE CLINIC | Facility: CLINIC | Age: 74
End: 2024-09-25

## 2024-09-25 NOTE — TELEPHONE ENCOUNTER
Upon review of the In Basket request we were able to locate, review, and update the patient chart as requested for Diabetic Eye Exam.    Any additional questions or concerns should be emailed to the Practice Liaisons via the appropriate education email address, please do not reply via In Basket.    Thank you  MAAME BRUMFIELD MA   [facility]

## 2024-09-25 NOTE — PROGRESS NOTES
CAROLYN CERVANTES did receive a message from provider regarding Pt needs assistance with transportation. Per chart review this seems that CAROLYN CERVANTES assisted Pt in the past.     After chart review CAROLYN CERVANTES placed a call to Pt to assist as needed. CAROLYN CERVANTES introduced herself, her role, and explained reason for call in Polish. Pt stated she has [employer] service but she does not want to continue using that service anymore. Pt expressed that she wants an uber paid per insurance because she does not have money. CAROLYN CERVANTES encouraged Pt to call the insurance to inquire if they can pay the service that she wants. Pt continued talking about the same thing. CAROLYN CERVANTES informed Pt that the insurance pay for her transportation but if she wants another type of transportation might be she needs to pay out of pocket.    CAROLYN CERVANTES inquired Pt if there is any other social needs that she needs help with. Pt declined other social needs at this time. Pt is aware that she can reach out the CAROLYN  for further support within office hours.    CAROLYN CERVANTES is closing this referral today due to Pt denied social needs at this time.  CAROLYN CERVANTES is remain available for further assistance as needed.

## 2024-09-25 NOTE — TELEPHONE ENCOUNTER
----- Message from Sophie Juan MD sent at 9/24/2024  1:53 PM EDT -----  Regarding: Care Gap Request  09/24/24 1:53 PM    Hello, our patient Lucie Ramirez has had Diabetic Eye Exam completed/performed. Please assist in updating the patient chart by pulling the document from Encounters Tab within Chart Review. The date of service is 9/20/24.     Thank you,  Sophie Juan MD   GORDO LEON

## 2024-10-03 ENCOUNTER — OFFICE VISIT (OUTPATIENT)
Dept: OBGYN CLINIC | Facility: MEDICAL CENTER | Age: 74
End: 2024-10-03
Payer: COMMERCIAL

## 2024-10-03 ENCOUNTER — APPOINTMENT (OUTPATIENT)
Dept: RADIOLOGY | Facility: MEDICAL CENTER | Age: 74
End: 2024-10-03
Payer: COMMERCIAL

## 2024-10-03 VITALS
DIASTOLIC BLOOD PRESSURE: 78 MMHG | HEIGHT: 64 IN | BODY MASS INDEX: 26.29 KG/M2 | HEART RATE: 64 BPM | SYSTOLIC BLOOD PRESSURE: 134 MMHG | WEIGHT: 154 LBS

## 2024-10-03 DIAGNOSIS — M17.0 PRIMARY OSTEOARTHRITIS OF BOTH KNEES: ICD-10-CM

## 2024-10-03 DIAGNOSIS — M25.561 CHRONIC PAIN OF BOTH KNEES: Primary | ICD-10-CM

## 2024-10-03 DIAGNOSIS — G89.29 CHRONIC PAIN OF BOTH KNEES: Primary | ICD-10-CM

## 2024-10-03 DIAGNOSIS — M25.561 CHRONIC PAIN OF BOTH KNEES: ICD-10-CM

## 2024-10-03 DIAGNOSIS — M25.562 CHRONIC PAIN OF BOTH KNEES: Primary | ICD-10-CM

## 2024-10-03 DIAGNOSIS — G89.29 CHRONIC PAIN OF BOTH KNEES: ICD-10-CM

## 2024-10-03 DIAGNOSIS — M11.20 CHONDROCALCINOSIS: ICD-10-CM

## 2024-10-03 DIAGNOSIS — M25.562 CHRONIC PAIN OF BOTH KNEES: ICD-10-CM

## 2024-10-03 PROCEDURE — 73564 X-RAY EXAM KNEE 4 OR MORE: CPT

## 2024-10-03 PROCEDURE — 99203 OFFICE O/P NEW LOW 30 MIN: CPT | Performed by: EMERGENCY MEDICINE

## 2024-10-03 NOTE — PROGRESS NOTES
Assessment/Plan:    Diagnoses and all orders for this visit:    Chronic pain of both knees  -     Ambulatory Referral to Orthopedic Surgery  -     XR knee 4+ vw right injury; Future  -     XR knee 4+ vw left injury; Future  -     Injection Procedure Prior Authorization; Future  -     Durable Medical Equipment  -     Cane    Primary osteoarthritis of both knees  -     XR knee 4+ vw right injury; Future  -     XR knee 4+ vw left injury; Future  -     Injection Procedure Prior Authorization; Future  -     Durable Medical Equipment  -     Cane    Chondrocalcinosis  -     Injection Procedure Prior Authorization; Future  -     Durable Medical Equipment  -     Cane      Ha1c 7.4    VISCOSUPPLEMENTATION:  Patient has failed conservative treatment including:  NSAIDs   Tylenol   Home exercises   Physical therapy   Weight loss  Assistive devices.    Patient has contraindication to a corticosteroid injection given her current diabetes  Patient is symptomatic and pain interferes with ADLs/sleep  Pain score 8/10      Return for Visco B/L knees.      Subjective:   Patient ID: Lucie Ramirez is a 74 y.o. female.    NP hx DM presents for chronic b/l knee pain L>R, no benefit from her medications, she attended PT for knees yesterday for eval.        Review of Systems    The following portions of the patient's chart were reviewed and updated as appropriate:   Allergy:    Allergies   Allergen Reactions   • Aspirin Other (See Comments) and Shortness Of Breath     eye, throat problems   • Diphenhydramine Shortness Of Breath   • Shellfish Allergy - Food Allergy Shortness Of Breath   • Ibuprofen Other (See Comments)     swelling, itchy eyes       Medications:    Current Outpatient Medications:   •  atenolol (TENORMIN) 100 mg tablet, Take 1 tablet (100 mg total) by mouth daily, Disp: 90 tablet, Rfl: 1  •  Cetirizine HCl (Zerviate) 0.24 % SOLN, Apply 1 drop to eye in the morning, Disp: 30 each, Rfl: 5  •  Continuous Glucose Sensor  (FreeStyle Sneha 3 Sensor) MISC, Use 1 each every 14 (fourteen) days, Disp: 6 each, Rfl: 3  •  DULoxetine (CYMBALTA) 30 mg delayed release capsule, , Disp: , Rfl:   •  ergocalciferol (ERGOCALCIFEROL) 1.25 MG (42985 UT) capsule, Take 1 capsule by mouth once a week, Disp: , Rfl:   •  gabapentin (NEURONTIN) 400 mg capsule, Take 1 capsule (400 mg total) by mouth 3 (three) times a day, Disp: 90 capsule, Rfl: 5  •  glucose blood test strip, Use Three times a day, Disp: , Rfl:   •  hydrOXYzine HCL (ATARAX) 25 mg tablet, Take 25 mg by mouth Three times a day, Disp: , Rfl:   •  Insulin Glargine Solostar (Lantus SoloStar) 100 UNIT/ML SOPN, Inject 0.22 mL (22 Units total) under the skin in the morning, Disp: 15 mL, Rfl: 2  •  Insulin Pen Needle 32G X 6 MM MISC, Use qith solostar pen daily, Disp: , Rfl:   •  Jardiance 25 MG TABS, Take 1 tablet by mouth every morning, Disp: , Rfl:   •  latanoprost (XALATAN) 0.005 % ophthalmic solution, Apply to eye, Disp: , Rfl:   •  magnesium Oxide (MAG-OX) 400 mg TABS, , Disp: , Rfl:   •  Mirabegron ER (Myrbetriq) 50 MG TB24, Take 1 tablet (50 mg total) by mouth daily, Disp: 30 tablet, Rfl: 11  •  mirtazapine (REMERON) 45 MG tablet, , Disp: , Rfl:   •  montelukast (SINGULAIR) 10 mg tablet, Take 10 mg by mouth daily, Disp: , Rfl:   •  polyethylene glycol-propylene glycol (Systane) 0.4-0.3 %, Apply 1 drop to eye every 3 (three) hours as needed (dry eyes), Disp: 146 mL, Rfl: 0  •  Premarin vaginal cream, INSERT 0.5 GRAMS INTO THE VAGINA DAILY, Disp: 30 g, Rfl: 2  •  Restasis 0.05 % ophthalmic emulsion, , Disp: , Rfl:   •  rosuvastatin (CRESTOR) 10 MG tablet, Take 10 mg by mouth, Disp: , Rfl:   •  traZODone (DESYREL) 50 mg tablet, Take 50 mg by mouth daily at bedtime, Disp: , Rfl:   •  triamcinolone (KENALOG) 0.1 % cream, Apply topically 2 (two) times a day, Disp: 30 g, Rfl: 1  •  zolpidem (AMBIEN) 10 mg tablet, Take 10 mg by mouth daily at bedtime as needed, Disp: , Rfl:   •  azelastine (ASTELIN)  "0.1 % nasal spray, 1-2 sprays into each nostril 2 (two) times a day, Disp: , Rfl:   •  lisinopril-hydrochlorothiazide (PRINZIDE,ZESTORETIC) 20-12.5 MG per tablet, Take 1 tablet by mouth daily, Disp: , Rfl:   •  metFORMIN (GLUCOPHAGE-XR) 750 mg 24 hr tablet, Take 750 mg by mouth, Disp: , Rfl:     Patient Active Problem List   Diagnosis   • Dementia ([facility])   • Type 2 diabetes mellitus, with long-term current use of insulin ([facility])   • Glaucoma   • Other hyperlipidemia   • Primary hypertension   • Mild episode of recurrent major depressive disorder ([facility])   • Encounter for medication review   • Environmental allergies   • Chronic midline low back pain with bilateral sciatica   • Vaginal atrophy   • Malignant neoplasm of breast (female) ([facility])   • Hyperparathyroidism due to renal insufficiency ([facility])   • Mild cognitive impairment with memory loss   • Stage 3a chronic kidney disease ([facility])   • Chondrocalcinosis   • Primary osteoarthritis of both knees   • Chronic pain of both knees       Objective:  /78   Pulse 64   Ht 5' 4\" (1.626 m)   Wt 69.9 kg (154 lb)   BMI 26.43 kg/m²     Right Knee Exam     Other   Erythema: absent  Swelling: mild      Left Knee Exam     Other   Erythema: absent  Swelling: mild          Physical Exam  Constitutional:       Appearance: She is well-developed.   HENT:      Head: Normocephalic and atraumatic.   Eyes:      Conjunctiva/sclera: Conjunctivae normal.   Pulmonary:      Effort: Pulmonary effort is normal.   Musculoskeletal:      Cervical back: Neck supple.   Skin:     General: Skin is warm and dry.   Neurological:      Mental Status: She is alert and oriented to person, place, and time.   Psychiatric:         Behavior: Behavior normal.         Neurologic Exam     Mental Status   Oriented to person, place, and time.       Procedures    I have personally reviewed pertinent films in PACS and my interpretation is Xrays B/L knees with diffuse mild degenerative changes including " chondrocalcinosis, no acute findings.            Past Medical History:   Diagnosis Date   • Breast cancer ([facility])    • Chronic midline low back pain with bilateral sciatica 2023   • Dementia ([facility])    • Environmental allergies 2023   • Glaucoma 2022   • Heart murmur    • Hyperparathyroidism due to renal insufficiency ([facility]) 2023   • Malignant neoplasm of breast (female) ([facility]) 2011   • Mild cognitive impairment with memory loss 2018    Last Assessment & Plan:    Formatting of this note might be different from the original.   Worsening   • Mild episode of recurrent major depressive disorder ([facility]) 2022   • Primary hypertension 2022   • Stage 3a chronic kidney disease ([facility]) 10/22/2019    Last Assessment & Plan:    Formatting of this note is different from the original.   Lab Results    Component Value Date     GFRE 40 (L) 2023     GFRE 40 (L) 2022     GFRE 55 (L) 2022     NGFRC 52 (L) 2022     NGFRC 45 (L) 2021     NGFRC 38 (L) 2021    Stable   • Type 2 diabetes mellitus, with long-term current use of insulin ([facility]) 2022       Past Surgical History:   Procedure Laterality Date   • APPENDECTOMY     •  SECTION     • HYSTERECTOMY     • KIDNEY SURGERY     • LUMBAR DISC SURGERY     • THROAT SURGERY         Social History     Socioeconomic History   • Marital status:      Spouse name: Not on file   • Number of children: 3   • Years of education: Not on file   • Highest education level: 11th grade   Occupational History   • Not on file   Tobacco Use   • Smoking status: Never   • Smokeless tobacco: Never   Vaping Use   • Vaping status: Never Used   Substance and Sexual Activity   • Alcohol use: Never   • Drug use: Never   • Sexual activity: Not on file   Other Topics Concern   • Not on file   Social History Narrative    Resides alone in an apartment    No pets    Occupation: retired    Grew up in [place]     Social  Determinants of Health     Financial Resource Strain: Low Risk  (9/24/2024)    Overall Financial Resource Strain (CARDIA)    • Difficulty of Paying Living Expenses: Not very hard   Food Insecurity: No Food Insecurity (9/24/2024)    Hunger Vital Sign    • Worried About Running Out of Food in the Last Year: Never true    • Ran Out of Food in the Last Year: Never true   Transportation Needs: No Transportation Needs (7/26/2024)    Received from [facility]    PRAPARE - Transportation    • Lack of Transportation (Medical): No    • Lack of Transportation (Non-Medical): No   Physical Activity: Not on file   Stress: Not on file   Social Connections: Not on file   Intimate Partner Violence: Not At Risk (7/26/2024)    Received from [facility]    Humiliation, Afraid, Rape, and Kick questionnaire    • Fear of Current or Ex-Partner: No    • Emotionally Abused: No    • Physically Abused: No    • Sexually Abused: No   Housing Stability: Low Risk  (7/26/2024)    Received from [facility]    Housing Stability Vital Sign    • Unable to Pay for Housing in the Last Year: No    • Number of Times Moved in the Last Year: 1    • Homeless in the Last Year: No       Family History   Problem Relation Age of Onset   • Hypertension Mother    • Diabetes Mother    • No Known Problems Father

## 2024-10-17 ENCOUNTER — TELEPHONE (OUTPATIENT)
Dept: FAMILY MEDICINE CLINIC | Facility: CLINIC | Age: 74
End: 2024-10-17

## 2024-10-17 NOTE — TELEPHONE ENCOUNTER
PCP SIGNATURE NEEDED FOR [facility] FORM RECEIVED VIA FAX AND PLACED IN PCP FOLDER TO BE DELIVERED AT ASSIGNED TIMES.     Order #[id number]

## 2024-10-22 ENCOUNTER — TELEPHONE (OUTPATIENT)
Dept: FAMILY MEDICINE CLINIC | Facility: CLINIC | Age: 74
End: 2024-10-22

## 2024-10-22 NOTE — TELEPHONE ENCOUNTER
PCP SIGNATURE NEEDED FOR [facility] health care & staffing agency FORM RECEIVED VIA FAX AND PLACED IN PCP FOLDER TO BE DELIVERED AT ASSIGNED TIMES.      Discharge summary

## 2024-10-29 ENCOUNTER — HOSPITAL ENCOUNTER (OUTPATIENT)
Dept: BONE DENSITY | Facility: MEDICAL CENTER | Age: 74
Discharge: HOME | End: 2024-10-29
Payer: COMMERCIAL

## 2024-10-29 ENCOUNTER — PROCEDURE VISIT (OUTPATIENT)
Dept: OBGYN CLINIC | Facility: MEDICAL CENTER | Age: 74
End: 2024-10-29
Payer: COMMERCIAL

## 2024-10-29 ENCOUNTER — TELEPHONE (OUTPATIENT)
Dept: FAMILY MEDICINE CLINIC | Facility: CLINIC | Age: 74
End: 2024-10-29

## 2024-10-29 VITALS
HEART RATE: 68 BPM | WEIGHT: 154 LBS | SYSTOLIC BLOOD PRESSURE: 148 MMHG | DIASTOLIC BLOOD PRESSURE: 80 MMHG | HEIGHT: 64 IN | BODY MASS INDEX: 26.29 KG/M2

## 2024-10-29 DIAGNOSIS — M11.20 CHONDROCALCINOSIS: ICD-10-CM

## 2024-10-29 DIAGNOSIS — M25.561 CHRONIC PAIN OF BOTH KNEES: Primary | ICD-10-CM

## 2024-10-29 DIAGNOSIS — G89.29 CHRONIC PAIN OF BOTH KNEES: ICD-10-CM

## 2024-10-29 DIAGNOSIS — Z91.09 ENVIRONMENTAL ALLERGIES: Primary | ICD-10-CM

## 2024-10-29 DIAGNOSIS — M25.561 CHRONIC PAIN OF BOTH KNEES: ICD-10-CM

## 2024-10-29 DIAGNOSIS — M17.0 PRIMARY OSTEOARTHRITIS OF BOTH KNEES: ICD-10-CM

## 2024-10-29 DIAGNOSIS — M25.562 CHRONIC PAIN OF BOTH KNEES: ICD-10-CM

## 2024-10-29 DIAGNOSIS — G89.29 CHRONIC PAIN OF BOTH KNEES: Primary | ICD-10-CM

## 2024-10-29 DIAGNOSIS — M25.562 CHRONIC PAIN OF BOTH KNEES: Primary | ICD-10-CM

## 2024-10-29 DIAGNOSIS — Z78.0 POST-MENOPAUSAL: ICD-10-CM

## 2024-10-29 PROCEDURE — 20610 DRAIN/INJ JOINT/BURSA W/O US: CPT | Performed by: EMERGENCY MEDICINE

## 2024-10-29 PROCEDURE — 77080 DXA BONE DENSITY AXIAL: CPT

## 2024-10-29 RX ORDER — CETIRIZINE HYDROCHLORIDE 10 MG/1
10 TABLET ORAL DAILY
Qty: 90 TABLET | Refills: 1 | Status: SHIPPED | OUTPATIENT
Start: 2024-10-29

## 2024-10-29 RX ORDER — ACETAMINOPHEN 500 MG
500 TABLET ORAL 2 TIMES DAILY PRN
Qty: 60 TABLET | Refills: 2 | Status: SHIPPED | OUTPATIENT
Start: 2024-10-29 | End: 2024-11-05

## 2024-10-29 RX ORDER — CETIRIZINE HYDROCHLORIDE 10 MG/1
10 TABLET, CHEWABLE ORAL DAILY
Qty: 30 TABLET | Refills: 2 | Status: SHIPPED | OUTPATIENT
Start: 2024-10-29 | End: 2024-10-29

## 2024-10-29 NOTE — PROGRESS NOTES
Assessment/Plan:    Diagnoses and all orders for this visit:    Chronic pain of both knees  -     Large joint arthrocentesis: bilateral knee    Primary osteoarthritis of both knees  -     Large joint arthrocentesis: bilateral knee    Chondrocalcinosis  -     Large joint arthrocentesis: bilateral knee    [facility] B/L knee Durolane injections  T/c CSIs however patient diabetic    Return if symptoms worsen or fail to improve.      Subjective:   Patient ID: Lucie Ramirez is a 74 y.o. female.    Patient really returns to the office for bilateral knee Durolane injections  Held off on CSI due to diabetes      Review of Systems    The following portions of the patient's chart were reviewed and updated as appropriate:   Allergy:    Allergies   Allergen Reactions   • Aspirin Other (See Comments) and Shortness Of Breath     eye, throat problems   • Diphenhydramine Shortness Of Breath   • Shellfish Allergy - Food Allergy Shortness Of Breath   • Ibuprofen Other (See Comments)     swelling, itchy eyes       Medications:    Current Outpatient Medications:   •  atenolol (TENORMIN) 100 mg tablet, Take 1 tablet (100 mg total) by mouth daily, Disp: 90 tablet, Rfl: 1  •  cetirizine (ZyrTEC) 10 MG chewable tablet, Chew 1 tablet (10 mg total) daily, Disp: 30 tablet, Rfl: 2  •  Cetirizine HCl (Zerviate) 0.24 % SOLN, Apply 1 drop to eye in the morning, Disp: 30 each, Rfl: 5  •  Continuous Glucose Sensor (FreeStyle Sneha 3 Sensor) MISC, Use 1 each every 14 (fourteen) days, Disp: 6 each, Rfl: 3  •  DULoxetine (CYMBALTA) 30 mg delayed release capsule, , Disp: , Rfl:   •  ergocalciferol (ERGOCALCIFEROL) 1.25 MG (79620 UT) capsule, Take 1 capsule by mouth once a week, Disp: , Rfl:   •  glucose blood test strip, Use Three times a day, Disp: , Rfl:   •  hydrOXYzine HCL (ATARAX) 25 mg tablet, Take 25 mg by mouth Three times a day, Disp: , Rfl:   •  Insulin Glargine Solostar (Lantus SoloStar) 100 UNIT/ML SOPN, Inject 0.22 mL  (22 Units total) under the skin in the morning, Disp: 15 mL, Rfl: 2  •  Insulin Pen Needle 32G X 6 MM MISC, Use qith solostar pen daily, Disp: , Rfl:   •  Jardiance 25 MG TABS, Take 1 tablet by mouth every morning, Disp: , Rfl:   •  latanoprost (XALATAN) 0.005 % ophthalmic solution, Apply to eye, Disp: , Rfl:   •  magnesium Oxide (MAG-OX) 400 mg TABS, , Disp: , Rfl:   •  Mirabegron ER (Myrbetriq) 50 MG TB24, Take 1 tablet (50 mg total) by mouth daily, Disp: 30 tablet, Rfl: 11  •  mirtazapine (REMERON) 45 MG tablet, , Disp: , Rfl:   •  montelukast (SINGULAIR) 10 mg tablet, Take 10 mg by mouth daily, Disp: , Rfl:   •  polyethylene glycol-propylene glycol (Systane) 0.4-0.3 %, Apply 1 drop to eye every 3 (three) hours as needed (dry eyes), Disp: 146 mL, Rfl: 0  •  Premarin vaginal cream, INSERT 0.5 GRAMS INTO THE VAGINA DAILY, Disp: 30 g, Rfl: 2  •  Restasis 0.05 % ophthalmic emulsion, , Disp: , Rfl:   •  rosuvastatin (CRESTOR) 10 MG tablet, Take 10 mg by mouth, Disp: , Rfl:   •  triamcinolone (KENALOG) 0.1 % cream, Apply topically 2 (two) times a day, Disp: 30 g, Rfl: 1  •  zolpidem (AMBIEN) 10 mg tablet, Take 10 mg by mouth daily at bedtime as needed, Disp: , Rfl:   •  azelastine (ASTELIN) 0.1 % nasal spray, 1-2 sprays into each nostril 2 (two) times a day, Disp: , Rfl:   •  gabapentin (NEURONTIN) 400 mg capsule, Take 1 capsule (400 mg total) by mouth 3 (three) times a day, Disp: 90 capsule, Rfl: 5  •  lisinopril-hydrochlorothiazide (PRINZIDE,ZESTORETIC) 20-12.5 MG per tablet, Take 1 tablet by mouth daily, Disp: , Rfl:   •  metFORMIN (GLUCOPHAGE-XR) 750 mg 24 hr tablet, Take 750 mg by mouth, Disp: , Rfl:   •  traZODone (DESYREL) 50 mg tablet, Take 50 mg by mouth daily at bedtime, Disp: , Rfl:     Patient Active Problem List   Diagnosis   • Dementia (HCC)   • Type 2 diabetes mellitus, with long-term current use of insulin (HCC)   • Glaucoma   • Other hyperlipidemia   • Primary hypertension   • Mild episode of recurrent  "major depressive disorder (HCC)   • Encounter for medication review   • Environmental allergies   • Chronic midline low back pain with bilateral sciatica   • Vaginal atrophy   • Malignant neoplasm of breast (female) (HCC)   • Hyperparathyroidism due to renal insufficiency (HCC)   • Mild cognitive impairment with memory loss   • Stage 3a chronic kidney disease (HCC)   • Chondrocalcinosis   • Primary osteoarthritis of both knees   • Chronic pain of both knees       Objective:  /80   Pulse 68   Ht 5' 4\" (1.626 m)   Wt 69.9 kg (154 lb)   BMI 26.43 kg/m²     Ortho Exam    Physical Exam      Neurologic Exam    Large joint arthrocentesis: bilateral knee  Universal Protocol:  Consent: Verbal consent obtained.  Risks and benefits: risks, benefits and alternatives were discussed  Consent given by: patient  Time out: Immediately prior to procedure a \"time out\" was called to verify the correct patient, procedure, equipment, support staff and site/side marked as required.  Timeout called at: 10/29/2024 3:25 PM.  Patient understanding: patient states understanding of the procedure being performed  Test results: test results available and properly labeled  Site marked: the operative site was marked  Patient identity confirmed: verbally with patient  Supporting Documentation  Indications: pain   Procedure Details  Location: knee - bilateral knee  Preparation: Patient was prepped and draped in the usual sterile fashion  Needle size: 20 G  Ultrasound guidance: no  Approach: anterolateral    Medications (Right): 3 mL sodium hyaluronate 60 MG/3MLSpecialty Pharmacy Supplied (Right): for right side  Medications (Left): 3 mL sodium hyaluronate 60 MG/3ML   Specialty Pharmacy Supplied (Left): for left side  Patient tolerance: patient tolerated the procedure well with no immediate complications  Dressing:  Sterile dressing applied    No erythema of knees        I have personally reviewed the written report of the pertinent studies. "             Past Medical History:   Diagnosis Date   • Breast cancer ([facility])    • Chronic midline low back pain with bilateral sciatica 2023   • Dementia ([facility])    • Environmental allergies 2023   • Glaucoma 2022   • Heart murmur    • Hyperparathyroidism due to renal insufficiency ([facility]) 2023   • Malignant neoplasm of breast (female) ([facility]) 2011   • Mild cognitive impairment with memory loss 2018    Last Assessment & Plan:    Formatting of this note might be different from the original.   Worsening   • Mild episode of recurrent major depressive disorder ([facility]) 2022   • Primary hypertension 2022   • Stage 3a chronic kidney disease ([facility]) 10/22/2019    Last Assessment & Plan:    Formatting of this note is different from the original.   Lab Results    Component Value Date     GFRE 40 (L) 2023     GFRE 40 (L) 2022     GFRE 55 (L) 2022     NGFRC 52 (L) 2022     NGFRC 45 (L) 2021     NGFRC 38 (L) 2021    Stable   • Type 2 diabetes mellitus, with long-term current use of insulin ([facility]) 2022       Past Surgical History:   Procedure Laterality Date   • APPENDECTOMY     •  SECTION     • HYSTERECTOMY     • KIDNEY SURGERY     • LUMBAR DISC SURGERY     • THROAT SURGERY         Social History     Socioeconomic History   • Marital status:      Spouse name: Not on file   • Number of children: 3   • Years of education: Not on file   • Highest education level: 11th grade   Occupational History   • Not on file   Tobacco Use   • Smoking status: Never   • Smokeless tobacco: Never   Vaping Use   • Vaping status: Never Used   Substance and Sexual Activity   • Alcohol use: Never   • Drug use: Never   • Sexual activity: Not on file   Other Topics Concern   • Not on file   Social History Narrative    Resides alone in an apartment    No pets    Occupation: retired    Grew up in [place]     Social Determinants of Health     Financial  Resource Strain: Low Risk  (9/24/2024)    Overall Financial Resource Strain (CARDIA)    • Difficulty of Paying Living Expenses: Not very hard   Food Insecurity: No Food Insecurity (9/24/2024)    Nursing - Inadequate Food Risk Classification    • Worried About Running Out of Food in the Last Year: Never true    • Ran Out of Food in the Last Year: Never true    • Ran Out of Food in the Last Year: Not on file   Transportation Needs: No Transportation Needs (7/26/2024)    [facility]    [facility]    • Lack of Transportation (Medical): No    • Lack of Transportation (Non-Medical): No   Physical Activity: Not on file   Stress: Not on file   Social Connections: Not on file   Intimate Partner Violence: Not At Risk (7/26/2024)    Received from [facility]    Humiliation, Afraid, Rape, and Kick questionnaire    • Fear of Current or Ex-Partner: No    • Emotionally Abused: No    • Physically Abused: No    • Sexually Abused: No   Housing Stability: Low Risk  (7/26/2024)    Received from [facility]    Housing Stability Vital Sign    • Unable to Pay for Housing in the Last Year: No    • Number of Times Moved in the Last Year: 1    • Homeless in the Last Year: No       Family History   Problem Relation Age of Onset   • Hypertension Mother    • Diabetes Mother    • No Known Problems Father

## 2024-10-29 NOTE — TELEPHONE ENCOUNTER
Pt  is requesting acetaminophen 500mg.     Not found in current medication list. Pt states that this was prescribed recently by her surgeon.   Informed this is a OTC medication, but Pt insisted on sending request to provider.     Please Advise,   Thank you!

## 2024-10-29 NOTE — TELEPHONE ENCOUNTER
FAXED ON 10/29/24 TO [facility]  at [phone]. FAX CONFIRMATION RECEIVED.    Scanned into pt chart.

## 2024-11-05 ENCOUNTER — OFFICE VISIT (OUTPATIENT)
Dept: FAMILY MEDICINE CLINIC | Facility: CLINIC | Age: 74
End: 2024-11-05

## 2024-11-05 VITALS
OXYGEN SATURATION: 96 % | DIASTOLIC BLOOD PRESSURE: 76 MMHG | TEMPERATURE: 97.6 F | HEART RATE: 79 BPM | SYSTOLIC BLOOD PRESSURE: 126 MMHG | BODY MASS INDEX: 26.87 KG/M2 | WEIGHT: 157.4 LBS | HEIGHT: 64 IN | RESPIRATION RATE: 16 BRPM

## 2024-11-05 DIAGNOSIS — G89.29 CHRONIC PAIN OF BOTH KNEES: ICD-10-CM

## 2024-11-05 DIAGNOSIS — M25.562 CHRONIC PAIN OF BOTH KNEES: ICD-10-CM

## 2024-11-05 DIAGNOSIS — G47.00 INSOMNIA, UNSPECIFIED TYPE: Primary | ICD-10-CM

## 2024-11-05 DIAGNOSIS — M17.0 PRIMARY OSTEOARTHRITIS OF BOTH KNEES: ICD-10-CM

## 2024-11-05 DIAGNOSIS — M85.80 OSTEOPENIA, UNSPECIFIED LOCATION: ICD-10-CM

## 2024-11-05 DIAGNOSIS — M25.561 CHRONIC PAIN OF BOTH KNEES: ICD-10-CM

## 2024-11-05 PROBLEM — Z79.899 ENCOUNTER FOR MEDICATION REVIEW: Status: RESOLVED | Noted: 2022-04-17 | Resolved: 2024-11-05

## 2024-11-05 PROCEDURE — 99214 OFFICE O/P EST MOD 30 MIN: CPT | Performed by: FAMILY MEDICINE

## 2024-11-05 PROCEDURE — 3074F SYST BP LT 130 MM HG: CPT | Performed by: FAMILY MEDICINE

## 2024-11-05 PROCEDURE — 3078F DIAST BP <80 MM HG: CPT | Performed by: FAMILY MEDICINE

## 2024-11-05 RX ORDER — SENNOSIDES 8.6 MG
650 CAPSULE ORAL EVERY 8 HOURS PRN
Qty: 30 TABLET | Refills: 5 | Status: SHIPPED | OUTPATIENT
Start: 2024-11-05

## 2024-11-05 RX ORDER — DOXEPIN HYDROCHLORIDE 10 MG/1
10 CAPSULE ORAL
Qty: 30 CAPSULE | Refills: 5 | Status: SHIPPED | OUTPATIENT
Start: 2024-11-05

## 2024-11-05 NOTE — ASSESSMENT & PLAN NOTE
Ice, heat, Tylenol as needed  Exercise as tolerated  Orders:    acetaminophen (TYLENOL) 650 mg CR tablet; Take 1 tablet (650 mg total) by mouth every 8 (eight) hours as needed for mild pain

## 2024-11-05 NOTE — PROGRESS NOTES
Ambulatory Visit  Name: Lucie Ramirez      : 1950      MRN: [id number]  Encounter Provider: Sophie Juan MD  Encounter Date: 2024   Encounter department: [facility]    Assessment & Plan  Insomnia, unspecified type  Patient unable to sleep  She brought in the medications she is currently taking for insomnia  Advised to continue Cymbalta, mirtazapine and zolpidem slowly over the next 2 weeks  We will start a trial of doxepin  Discussed sleep hygiene    Orders:    doxepin (SINEquan) 10 mg capsule; Take 1 capsule (10 mg total) by mouth daily at bedtime    Chronic pain of both knees  Ice, heat, Tylenol as needed  Exercise as tolerated  Orders:    acetaminophen (TYLENOL) 650 mg CR tablet; Take 1 tablet (650 mg total) by mouth every 8 (eight) hours as needed for mild pain    Primary osteoarthritis of both knees  Discussed DEXA scan results  Osteopenia  Advised weightbearing exercise and calcium/vitamin D supplements            History of Present Illness     HPI  Lucie Ramirez is a 74 y.o. female  who presented to the office today to discuss difficulty sleeping.  She brought in medication today: Mirtazipine, Trazodone and Zolpidem prescribed by her psychiatrist at [facility].  She has a therapist as well that she talks to virtually.  She states has been taking for many years and is still not sleeping well.  She brings in bottles today of her medications: Cymbalta, mirtazapine and zolpidem  She states even while using this medication she is not able to sleep more than 2 to 3 hours per night.  She feels very fatigued and distressed due to her inability to sleep.    The following portions of the patient's history were reviewed and updated as appropriate: allergies, current medications, past family history, past medical history, past social history, past surgical history and problem list.    History obtained from : patient  Review of Systems   Constitutional:  Positive for  "fatigue. Negative for chills and fever.   HENT:  Negative for congestion, rhinorrhea and sore throat.    Respiratory:  Negative for cough and shortness of breath.    Cardiovascular:  Negative for chest pain.   Gastrointestinal:  Negative for diarrhea, nausea and vomiting.   Musculoskeletal:  Positive for back pain and gait problem.   Skin:  Negative for rash.   Neurological:  Positive for headaches. Negative for dizziness.   Psychiatric/Behavioral:  Positive for sleep disturbance.            Objective     /76 (BP Location: Right arm, Patient Position: Sitting, Cuff Size: Standard)   Pulse 79   Temp 97.6 °F (36.4 °C) (Temporal)   Resp 16   Ht 5' 4\" (1.626 m)   Wt 71.4 kg (157 lb 6.4 oz)   SpO2 96%   BMI 27.02 kg/m²     Physical Exam  Vitals and nursing note reviewed.   Constitutional:       General: She is not in acute distress.     Appearance: She is well-developed.   HENT:      Head: Normocephalic and atraumatic.   Eyes:      Conjunctiva/sclera: Conjunctivae normal.   Cardiovascular:      Rate and Rhythm: Normal rate and regular rhythm.   Pulmonary:      Effort: Pulmonary effort is normal. No respiratory distress.   Abdominal:      Palpations: Abdomen is soft.      Tenderness: There is no abdominal tenderness.   Musculoskeletal:         General: No swelling.      Cervical back: Neck supple.   Skin:     General: Skin is warm and dry.      Capillary Refill: Capillary refill takes less than 2 seconds.   Neurological:      Mental Status: She is alert and oriented to person, place, and time.   Psychiatric:         Mood and Affect: Mood normal.         Behavior: Behavior normal.         "

## 2024-11-05 NOTE — ASSESSMENT & PLAN NOTE
Discussed DEXA scan results  Osteopenia  Advised weightbearing exercise and calcium/vitamin D supplements

## 2024-11-12 PROBLEM — M85.80 OSTEOPENIA: Status: ACTIVE | Noted: 2024-11-12

## 2024-11-21 ENCOUNTER — TELEPHONE (OUTPATIENT)
Dept: FAMILY MEDICINE CLINIC | Facility: CLINIC | Age: 74
End: 2024-11-21

## 2024-11-21 DIAGNOSIS — L85.3 XEROSIS CUTIS: Primary | ICD-10-CM

## 2024-11-21 NOTE — TELEPHONE ENCOUNTER
Patient called office today stating that pcp were to sent cream for skin dryness. Please advise. Thank you!

## 2024-11-25 ENCOUNTER — TELEPHONE (OUTPATIENT)
Dept: FAMILY MEDICINE CLINIC | Facility: CLINIC | Age: 74
End: 2024-11-25

## 2024-11-25 RX ORDER — AMMONIUM LACTATE 12 G/100G
CREAM TOPICAL AS NEEDED
Qty: 385 G | Refills: 0 | Status: SHIPPED | OUTPATIENT
Start: 2024-11-25

## 2024-11-25 NOTE — TELEPHONE ENCOUNTER
Pt called into office and is requesting if she can be prescribed something different or stronger for her insomnia, pt stated the current medication is not working and she is still having difficulties sleeping. Please advise.

## 2024-11-25 NOTE — TELEPHONE ENCOUNTER
PATIENT CALLED AND STATED THAT THE MEDICATION THAT WAS PRESCRIBED IS NOT WORKING AND WOULD LIKE SOMETHING STRONGER.     doxepin (SINEquan) 10 mg capsule   NOT WORKING FOR HER.    PLEASE ADVISE

## 2024-11-25 NOTE — TELEPHONE ENCOUNTER
I have  placed the order for Amlactin cream for the dry skin.  As far as the sleep, that is a chronic issue, and we can discuss at her next visit.  If she can not sleep with the Doxepin, then she can restart her old medication I discontinued at the last visit - Cymbalta, Mirtazapine and Zolpidem

## 2024-11-25 NOTE — TELEPHONE ENCOUNTER
Pt has been informed, pt stated she does not have those current medication pt would like to know if possible if prescription can be sent. Please let me know if you would like me to schedule an appt for her.

## 2024-11-25 NOTE — TELEPHONE ENCOUNTER
So lets just schedule an appointment for her, I don't want her to get confused with the medications.  Please tell her to bring in what she is taking at this time.

## 2024-11-26 ENCOUNTER — OFFICE VISIT (OUTPATIENT)
Dept: FAMILY MEDICINE CLINIC | Facility: CLINIC | Age: 74
End: 2024-11-26

## 2024-11-26 VITALS
DIASTOLIC BLOOD PRESSURE: 76 MMHG | HEART RATE: 86 BPM | SYSTOLIC BLOOD PRESSURE: 128 MMHG | BODY MASS INDEX: 26.6 KG/M2 | WEIGHT: 155.8 LBS | TEMPERATURE: 97.8 F | RESPIRATION RATE: 17 BRPM | HEIGHT: 64 IN | OXYGEN SATURATION: 96 %

## 2024-11-26 DIAGNOSIS — F32.A DEPRESSIVE DISORDER: ICD-10-CM

## 2024-11-26 DIAGNOSIS — G47.00 INSOMNIA, UNSPECIFIED TYPE: ICD-10-CM

## 2024-11-26 DIAGNOSIS — M85.80 OSTEOPENIA, UNSPECIFIED LOCATION: Primary | ICD-10-CM

## 2024-11-26 PROCEDURE — 3074F SYST BP LT 130 MM HG: CPT | Performed by: FAMILY MEDICINE

## 2024-11-26 PROCEDURE — 99214 OFFICE O/P EST MOD 30 MIN: CPT | Performed by: FAMILY MEDICINE

## 2024-11-26 PROCEDURE — 3078F DIAST BP <80 MM HG: CPT | Performed by: FAMILY MEDICINE

## 2024-11-26 RX ORDER — MIRTAZAPINE 15 MG/1
15 TABLET, FILM COATED ORAL
Qty: 30 TABLET | Refills: 5 | Status: SHIPPED | OUTPATIENT
Start: 2024-11-26

## 2024-11-26 RX ORDER — B-COMPLEX WITH VITAMIN C
1 TABLET ORAL
Qty: 30 TABLET | Refills: 5 | Status: SHIPPED | OUTPATIENT
Start: 2024-11-26

## 2024-11-26 NOTE — ASSESSMENT & PLAN NOTE
+ symptoms of depression, anxiety  No SI/HI ideation  Will restart Mirtazapine 15 mg  Currently not seeing a psychiatrist or therapist  Follow-up in 2 to 4 weeks    Orders:    Ambulatory referral to Psych Services; Future    mirtazapine (REMERON) 15 mg tablet; Take 1 tablet (15 mg total) by mouth daily at bedtime

## 2024-11-26 NOTE — ASSESSMENT & PLAN NOTE
Discontinue Doxepin  Trial of Melatonin    Orders:    Ambulatory referral to Psych Services; Future    melatonin 3 mg; Take 1 tablet (3 mg total) by mouth daily at bedtime

## 2024-11-26 NOTE — PROGRESS NOTES
Name: Lucie Ramirez      : 1950      MRN: [id number]  Encounter Provider: Sophie Juan MD  Encounter Date: 2024   Encounter department: [facility]  :  Assessment & Plan  Insomnia, unspecified type  Discontinue Doxepin  Trial of Melatonin    Orders:    Ambulatory referral to Psych Services; Future    melatonin 3 mg; Take 1 tablet (3 mg total) by mouth daily at bedtime    Depressive disorder  + symptoms of depression, anxiety  No SI/HI ideation  Will restart Mirtazapine 15 mg  Currently not seeing a psychiatrist or therapist  Follow-up in 2 to 4 weeks    Orders:    Ambulatory referral to Psych Services; Future    mirtazapine (REMERON) 15 mg tablet; Take 1 tablet (15 mg total) by mouth daily at bedtime    Osteopenia, unspecified location    Orders:    calcium carbonate-vitamin D 500 mg-5 mcg per tablet; Take 1 tablet by mouth daily with breakfast           History of Present Illness     HPI    Lucie Ramirez is a 74 y.o. female  who presented to the office today for follow up for Insomnia.  Pt states she stopped the Cymbalta, Mirtazapine and Zolpidem, and then started the Doxepin every night.  She states it did not work, and she only sleeps about 2 hours a night.    Pt states is very depressed, feels sad and hopeless  No longer following with her [facility] or therapist online.  She is tried taking many medications in the past for her symptoms and at this time does not think anything will help her.    Risk screen  Little interest or pleasure in doing things. Several or more days of the week? Yes    Feeling down, depressed or hopeless. Several or more days of the week? Yes   Thoughts that you would be better off dead or of hurting yourself in some way. No  Have you actually had any thoughts about killing yourself? No      Review of Systems   Constitutional:  Positive for fatigue. Negative for chills and fever.   HENT:  Negative for congestion, rhinorrhea and sore  "throat.    Respiratory:  Negative for cough and shortness of breath.    Cardiovascular:  Negative for chest pain.   Gastrointestinal:  Negative for diarrhea, nausea and vomiting.   Musculoskeletal:  Positive for back pain and gait problem.   Skin:  Negative for rash.   Neurological:  Positive for headaches. Negative for dizziness.   Psychiatric/Behavioral:  Positive for behavioral problems, decreased concentration and sleep disturbance. The patient is nervous/anxious.           Objective   /76 (BP Location: Right arm, Patient Position: Sitting, Cuff Size: Standard)   Pulse 86   Temp 97.8 °F (36.6 °C) (Temporal)   Resp 17   Ht 5' 4\" (1.626 m)   Wt 70.7 kg (155 lb 12.8 oz)   SpO2 96%   BMI 26.74 kg/m²      Physical Exam  Vitals and nursing note reviewed.   Constitutional:       General: She is not in acute distress.     Appearance: She is well-developed.   HENT:      Head: Normocephalic and atraumatic.   Eyes:      Conjunctiva/sclera: Conjunctivae normal.   Cardiovascular:      Rate and Rhythm: Normal rate and regular rhythm.   Pulmonary:      Effort: Pulmonary effort is normal. No respiratory distress.   Abdominal:      Palpations: Abdomen is soft.      Tenderness: There is no abdominal tenderness.   Musculoskeletal:         General: No swelling.      Cervical back: Neck supple.   Skin:     General: Skin is warm and dry.      Capillary Refill: Capillary refill takes less than 2 seconds.   Neurological:      Mental Status: She is alert and oriented to person, place, and time.   Psychiatric:         Mood and Affect: Mood is depressed.         Behavior: Behavior normal.       Administrative Statements   I have spent a total time of 40 minutes in caring for this patient on the day of the visit/encounter including Prognosis, Risks and benefits of tx options, Instructions for management, Importance of tx compliance, Counseling / Coordination of care, Documenting in the medical record, and Reviewing / ordering " tests, medicine, procedures  .

## 2024-11-26 NOTE — ASSESSMENT & PLAN NOTE
Orders:    calcium carbonate-vitamin D 500 mg-5 mcg per tablet; Take 1 tablet by mouth daily with breakfast

## 2024-12-02 NOTE — PATIENT INSTRUCTIONS
Plan de Seguridad  Paso 1: Señal de advertencia: (Cuales son los signos or señales que necesito para usar marlen plan?)  1. _______________________________________________________________________  2. _______________________________________________________________________  3. _______________________________________________________________________    Paso 2: Estrategias internas de afrontamiento: (Cosas que puedo hacer para dejar de pensar mis problemas sin contactar otra persona)  1. _______________________________________________________________________  2. _______________________________________________________________________  3. _______________________________________________________________________    Paso 3: Personas y entornos sociales que proporcionan distraccion: (No es necesario que morgan personas a las que les cuentes tus problemas)  1. Nombre:_____________________________ Telephono:____________________________  2. Nombre:_____________________________ Telephono:____________________________  3. Lugar:_________________________________________________________________  4. Lugar:_________________________________________________________________    Paso 4: Personas a las que puedo preguntar por ayuda:  1. Nombre:_________________________________ Telephono:__________________________  2. Nombre:_________________________________ Telephono:__________________________    [facility] calida de respuesta: Llamar [phone]. Esta linea calida de respuesta proveee servicios the telephono para residentes adultos (18 años y mayores) de [facility] que necesitan apoyo emocional, ayuda para resolver problemas o information y referidos. (Horas 7 balderrama a la semana 6:00 AM and 2:00 AM)    Paso 5: Profesionales o agencias que puedo contactar ambrose tony crisis:    1. [employer] L[employer]nea [employer] de Prevención del Suicidio llama las 24 horas del día, los 7 días de la semana, para cualquier persona con pensamientos suicidas o que  esté en crisis. Call: [phone]  2. Envía un mensaje de texto con la palabra AYUDA al [phone] para comunicarte de manera gratuita con un Consejero de Crisis. Apoyo gratuito las 24 horas del día, los 7 días de la semana, al alcance de tu mano.    3. Número de teléfono de crisis local: Call: [phone]   Paso 6: Hacer que el ambiente sea seguro: (pasos que se distancian de los medios letales por ejemplo: bárbara medicamentos a tony persona de apoyo)  1:______________________________________________________________________  2:______________________________________________________________________

## 2024-12-04 ENCOUNTER — TELEPHONE (OUTPATIENT)
Age: 74
End: 2024-12-04

## 2024-12-04 NOTE — TELEPHONE ENCOUNTER
"Contacted the patient regarding a routine referral to verify service needs and inform pt of the current waitlist. Call was answered, writer announced where calling from when writer tried to verify pt's full name & , pt was reluctant because she did not understand where writer was calling from writer explained again where writer was calling from and nature of call pt stated \"Lucie was not feeling well\" writer asked if pt would like to call back and provided call back number for intake. The person who answered stated writer was still not explaining where writer was calling from writer explained in order to go into details about the referral would need to confirm speaking to the correct person. Caller disconnected call.     [facility] [place]: Consuelo Dowling [phone]  "

## 2024-12-09 ENCOUNTER — OFFICE VISIT (OUTPATIENT)
Dept: PODIATRY | Facility: CLINIC | Age: 74
End: 2024-12-09
Payer: COMMERCIAL

## 2024-12-09 DIAGNOSIS — Z79.4 TYPE 2 DIABETES MELLITUS WITH DIABETIC POLYNEUROPATHY, WITH LONG-TERM CURRENT USE OF INSULIN (HCC): Primary | ICD-10-CM

## 2024-12-09 DIAGNOSIS — E11.42 TYPE 2 DIABETES MELLITUS WITH DIABETIC POLYNEUROPATHY, WITH LONG-TERM CURRENT USE OF INSULIN (HCC): Primary | ICD-10-CM

## 2024-12-09 PROCEDURE — 99203 OFFICE O/P NEW LOW 30 MIN: CPT | Performed by: PODIATRIST

## 2024-12-09 NOTE — ASSESSMENT & PLAN NOTE
Lab Results   Component Value Date    HGBA1C 7.1 (H) 10/31/2024   Diagnosis and options discussed with patient  Patient agreeable to the plan as stated below    -DM foot risk is low. Recommend annual DM foot exam  -Discussed DM risk to lower extremities, proper shoe gear, and daily monitoring of feet.   -Discussed weight loss and suitable exercise regiment.   -Reviewed most recent PCP visit on 11/26/2024  -Educated on A1C and the risks of poorly controlled Diabetes. Reviewed recent A1C:  Lab Results   Component Value Date    HGBA1C 7.1 (H) 10/31/2024   .

## 2024-12-09 NOTE — PROGRESS NOTES
Name: Lucie Ramirez      : 1950      MRN: [id number]  Encounter Provider: Raghavendra Hines DPM  Encounter Date: 2024   Encounter department: [facility] [place]  :  Assessment & Plan  Type 2 diabetes mellitus with diabetic polyneuropathy, with long-term current use of insulin (HCC)    Lab Results   Component Value Date    HGBA1C 7.1 (H) 10/31/2024   Diagnosis and options discussed with patient  Patient agreeable to the plan as stated below    -DM foot risk is low. Recommend annual DM foot exam  -Discussed DM risk to lower extremities, proper shoe gear, and daily monitoring of feet.   -Discussed weight loss and suitable exercise regiment.   -Reviewed most recent PCP visit on 2024  -Educated on A1C and the risks of poorly controlled Diabetes. Reviewed recent A1C:  Lab Results   Component Value Date    HGBA1C 7.1 (H) 10/31/2024   .                History of Present Illness   HPI  Lucie Ramirez is a 74 y.o. female who presents for DM foot exam. She has DM. She states she has some dry skin on her feet. She has some nail fungus. She denies numbness in her feet.       Review of Systems  As stated in HPI, otherwise normal    Medical History Reviewed by provider this encounter:  Tobacco  Allergies  Meds  Problems  Med Hx  Surg Hx  Fam Hx           Objective   There were no vitals taken for this visit.     Physical Exam  Cardiovascular:      Pulses: no weak pulses.           Dorsalis pedis pulses are 2+ on the right side and 2+ on the left side.        Posterior tibial pulses are 2+ on the right side and 2+ on the left side.   Musculoskeletal:      Right foot: Bunion (no pain, no crepitus) present.      Left foot: Bunion (no pain, no crepitus) present.   Feet:      Right foot:      Protective Sensation: 10 sites tested.  10 sites sensed.      Skin integrity: Skin integrity normal. No ulcer, skin breakdown, erythema, warmth, callus or dry skin.      Toenail Condition: Right  toenails are long.      Left foot:      Protective Sensation: 10 sites tested.  10 sites sensed.      Skin integrity: Skin integrity normal. No ulcer, skin breakdown, erythema, warmth, callus or dry skin.      Toenail Condition: Left toenails are long.     Diabetic Foot Exam    Patient's shoes and socks removed.    Right Foot/Ankle   Right Foot Inspection  Skin Exam: skin normal and skin intact. No dry skin, no warmth, no callus, no erythema, no maceration, no abnormal color, no pre-ulcer, no ulcer and no callus.     Toe Exam: ROM and strength within normal limits and right toe deformity.     Sensory   Vibration: intact  Proprioception: intact  Monofilament testing: intact    Vascular  Capillary refills: < 3 seconds  The right DP pulse is 2+. The right PT pulse is 2+.     Right Toe  - Comprehensive Exam  Hallux valgus: yes (no pain, no crepitus)      Left Foot/Ankle  Left Foot Inspection  Skin Exam: skin normal and skin intact. No dry skin, no warmth, no erythema, no maceration, normal color, no pre-ulcer, no ulcer and no callus.     Toe Exam: ROM and strength within normal limits and left toe deformity.     Sensory   Vibration: intact  Proprioception: intact  Monofilament testing: intact    Vascular  Capillary refills: < 3 seconds  The left DP pulse is 2+. The left PT pulse is 2+.     Left Toe  - Comprehensive Exam  Hallux valgus: yes (no pain, no crepitus)      Assign Risk Category  Deformity present  No loss of protective sensation  No weak pulses  Risk: 0

## 2024-12-11 ENCOUNTER — TELEPHONE (OUTPATIENT)
Dept: FAMILY MEDICINE CLINIC | Facility: CLINIC | Age: 74
End: 2024-12-11

## 2024-12-12 ENCOUNTER — TELEPHONE (OUTPATIENT)
Dept: PAIN MEDICINE | Facility: MEDICAL CENTER | Age: 74
End: 2024-12-12

## 2024-12-12 NOTE — TELEPHONE ENCOUNTER
I called patient to remind her of tomorrow's appt. With Dr. Seals for B/L KNEES. Left a voicemail in Romansh telling her arrival time @ 3:00pm and appointment at 3:15pm.

## 2024-12-13 ENCOUNTER — TELEMEDICINE (OUTPATIENT)
Dept: FAMILY MEDICINE CLINIC | Facility: CLINIC | Age: 74
End: 2024-12-13

## 2024-12-13 DIAGNOSIS — Z91.09 ENVIRONMENTAL ALLERGIES: Primary | ICD-10-CM

## 2024-12-13 PROCEDURE — 99213 OFFICE O/P EST LOW 20 MIN: CPT | Performed by: FAMILY MEDICINE

## 2024-12-13 PROCEDURE — 3074F SYST BP LT 130 MM HG: CPT | Performed by: FAMILY MEDICINE

## 2024-12-13 PROCEDURE — 3078F DIAST BP <80 MM HG: CPT | Performed by: FAMILY MEDICINE

## 2024-12-13 RX ORDER — FEXOFENADINE HCL 60 MG/1
60 TABLET, FILM COATED ORAL DAILY
Qty: 30 TABLET | Refills: 5 | Status: SHIPPED | OUTPATIENT
Start: 2024-12-13

## 2024-12-13 NOTE — PROGRESS NOTES
Virtual Regular Visit  Name: Lucie Ramirez      : 1950      MRN: [id number]  Encounter Provider: Sophie Juan MD  Encounter Date: 2024   Encounter department: [facility]      Verification of patient location:  Patient is located at Home in the following state in which I hold an active license [place] :  Assessment & Plan  Environmental allergies  Discontinue  Zyrtec  Start Allegra 60 mg every night  Continue to use nasal saline  Avoid allergy triggers   Orders:  •  fexofenadine (ALLEGRA) 60 MG tablet; Take 1 tablet (60 mg total) by mouth daily      Patient also stated today she needs a letter to apply for [place] citizenship    Patient stated today she needs a letter to apply for [place] citizenship.  Advised patient there is usually a form that is completed, and she may make a appointment for an office visit to have that completed.  Patient agreeable.    Called pt's daughter, nelsy alejo, called granddaughter and spoke to her reagrding my concerns for her grandmother.  She states that danial the only daughter that was living in [place] moved to [place], and the pt desires to be indepenedant and live alone.  I expressed my concern to the grandaughter that she has increased insomnia and depressive symptoms.  I would recommend the family discuss ways to support her emotionally if possible.      Encounter provider Sophie Juan MD    The patient was identified by name and date of birth. Lucie Ramirez was informed that this is a telemedicine visit and that the visit is being conducted through the [employer] Teams platform. She agrees to proceed..  My office door was closed. No one else was in the room.  She acknowledged consent and understanding of privacy and security of the video platform. The patient has agreed to participate and understands they can discontinue the visit at any time.    Patient is aware this is a billable service.      History of Present Illness     HPI  Lucie Ramirez is a 74 y.o. female  who presented to the office today due to increasing intermittent headaches and patient states her allergy symptoms are not well-controlled and thinks this is why she is having the increase in headaches.    She is currently using nasal saline, Singulair without relief.  No complaint of chest pain, shortness of breath, fever, chills, nausea or vomiting    Review of Systems   Constitutional:  Negative for fever.   HENT:  Positive for congestion.    Respiratory:  Negative for shortness of breath.    Cardiovascular:  Negative for chest pain.   Allergic/Immunologic: Positive for environmental allergies.   Neurological:  Positive for headaches.       Objective   There were no vitals taken for this visit.    Physical Exam  Neurological:      Mental Status: She is alert.         Visit Time  Total Visit Duration: 10 minutes

## 2024-12-13 NOTE — ASSESSMENT & PLAN NOTE
Discontinue  Zyrtec  Start Allegra 60 mg every night  Continue to use nasal saline  Avoid allergy triggers   Orders:  •  fexofenadine (ALLEGRA) 60 MG tablet; Take 1 tablet (60 mg total) by mouth daily

## 2024-12-16 ENCOUNTER — APPOINTMENT (EMERGENCY)
Dept: RADIOLOGY | Facility: HOSPITAL | Age: 74
End: 2024-12-16
Payer: COMMERCIAL

## 2024-12-16 ENCOUNTER — HOSPITAL ENCOUNTER (EMERGENCY)
Facility: HOSPITAL | Age: 74
Discharge: HOME | End: 2024-12-16
Attending: EMERGENCY MEDICINE
Payer: COMMERCIAL

## 2024-12-16 VITALS
TEMPERATURE: 98.8 F | OXYGEN SATURATION: 98 % | WEIGHT: 164.02 LBS | SYSTOLIC BLOOD PRESSURE: 192 MMHG | HEART RATE: 86 BPM | DIASTOLIC BLOOD PRESSURE: 98 MMHG | RESPIRATION RATE: 18 BRPM | BODY MASS INDEX: 28.15 KG/M2

## 2024-12-16 DIAGNOSIS — R51.9 HEADACHE: ICD-10-CM

## 2024-12-16 DIAGNOSIS — M25.569 KNEE PAIN: Primary | ICD-10-CM

## 2024-12-16 DIAGNOSIS — E83.42 HYPOMAGNESEMIA: ICD-10-CM

## 2024-12-16 LAB
ALBUMIN SERPL BCG-MCNC: 3.9 G/DL (ref 3.5–5)
ALP SERPL-CCNC: 78 U/L (ref 34–104)
ALT SERPL W P-5'-P-CCNC: 17 U/L (ref 7–52)
ANION GAP SERPL CALCULATED.3IONS-SCNC: 7 MMOL/L (ref 4–13)
AST SERPL W P-5'-P-CCNC: 18 U/L (ref 13–39)
BASOPHILS # BLD AUTO: 0.06 THOUSANDS/ÂΜL (ref 0–0.1)
BASOPHILS NFR BLD AUTO: 1 % (ref 0–1)
BILIRUB DIRECT SERPL-MCNC: 0.09 MG/DL (ref 0–0.2)
BILIRUB SERPL-MCNC: 0.41 MG/DL (ref 0.2–1)
BILIRUB UR QL STRIP: NEGATIVE
BUN SERPL-MCNC: 22 MG/DL (ref 5–25)
CALCIUM SERPL-MCNC: 9.9 MG/DL (ref 8.4–10.2)
CHLORIDE SERPL-SCNC: 104 MMOL/L (ref 96–108)
CLARITY UR: CLEAR
CO2 SERPL-SCNC: 25 MMOL/L (ref 21–32)
COLOR UR: NORMAL
CREAT SERPL-MCNC: 0.98 MG/DL (ref 0.6–1.3)
EOSINOPHIL # BLD AUTO: 0.46 THOUSAND/ÂΜL (ref 0–0.61)
EOSINOPHIL NFR BLD AUTO: 5 % (ref 0–6)
ERYTHROCYTE [DISTWIDTH] IN BLOOD BY AUTOMATED COUNT: 13.1 % (ref 11.6–15.1)
GFR SERPL CREATININE-BSD FRML MDRD: 56 ML/MIN/1.73SQ M
GLUCOSE SERPL-MCNC: 199 MG/DL (ref 65–140)
GLUCOSE UR STRIP-MCNC: NEGATIVE MG/DL
HCT VFR BLD AUTO: 33.2 % (ref 34.8–46.1)
HGB BLD-MCNC: 11.2 G/DL (ref 11.5–15.4)
HGB UR QL STRIP.AUTO: NEGATIVE
IMM GRANULOCYTES # BLD AUTO: 0.01 THOUSAND/UL (ref 0–0.2)
IMM GRANULOCYTES NFR BLD AUTO: 0 % (ref 0–2)
KETONES UR STRIP-MCNC: NEGATIVE MG/DL
LEUKOCYTE ESTERASE UR QL STRIP: NEGATIVE
LYMPHOCYTES # BLD AUTO: 3.95 THOUSANDS/ÂΜL (ref 0.6–4.47)
LYMPHOCYTES NFR BLD AUTO: 45 % (ref 14–44)
MAGNESIUM SERPL-MCNC: 1.1 MG/DL (ref 1.9–2.7)
MCH RBC QN AUTO: 31.4 PG (ref 26.8–34.3)
MCHC RBC AUTO-ENTMCNC: 33.7 G/DL (ref 31.4–37.4)
MCV RBC AUTO: 93 FL (ref 82–98)
MONOCYTES # BLD AUTO: 0.73 THOUSAND/ÂΜL (ref 0.17–1.22)
MONOCYTES NFR BLD AUTO: 8 % (ref 4–12)
NEUTROPHILS # BLD AUTO: 3.69 THOUSANDS/ÂΜL (ref 1.85–7.62)
NEUTS SEG NFR BLD AUTO: 41 % (ref 43–75)
NITRITE UR QL STRIP: NEGATIVE
NRBC BLD AUTO-RTO: 0 /100 WBCS
PH UR STRIP.AUTO: 5.5 [PH]
PLATELET # BLD AUTO: 322 THOUSANDS/UL (ref 149–390)
PMV BLD AUTO: 9.6 FL (ref 8.9–12.7)
POTASSIUM SERPL-SCNC: 4.6 MMOL/L (ref 3.5–5.3)
PROT SERPL-MCNC: 7.7 G/DL (ref 6.4–8.4)
PROT UR STRIP-MCNC: NEGATIVE MG/DL
RBC # BLD AUTO: 3.57 MILLION/UL (ref 3.81–5.12)
SODIUM SERPL-SCNC: 136 MMOL/L (ref 135–147)
SP GR UR STRIP.AUTO: 1.01 (ref 1–1.03)
TSH SERPL DL<=0.05 MIU/L-ACNC: 2.04 UIU/ML (ref 0.45–4.5)
UROBILINOGEN UR STRIP-ACNC: <2 MG/DL
WBC # BLD AUTO: 8.9 THOUSAND/UL (ref 4.31–10.16)

## 2024-12-16 PROCEDURE — 73564 X-RAY EXAM KNEE 4 OR MORE: CPT

## 2024-12-16 PROCEDURE — 87086 URINE CULTURE/COLONY COUNT: CPT | Performed by: EMERGENCY MEDICINE

## 2024-12-16 PROCEDURE — 80076 HEPATIC FUNCTION PANEL: CPT | Performed by: EMERGENCY MEDICINE

## 2024-12-16 PROCEDURE — 96366 THER/PROPH/DIAG IV INF ADDON: CPT

## 2024-12-16 PROCEDURE — 80048 BASIC METABOLIC PNL TOTAL CA: CPT | Performed by: EMERGENCY MEDICINE

## 2024-12-16 PROCEDURE — 83735 ASSAY OF MAGNESIUM: CPT | Performed by: EMERGENCY MEDICINE

## 2024-12-16 PROCEDURE — 36415 COLL VENOUS BLD VENIPUNCTURE: CPT | Performed by: EMERGENCY MEDICINE

## 2024-12-16 PROCEDURE — 96365 THER/PROPH/DIAG IV INF INIT: CPT

## 2024-12-16 PROCEDURE — 99285 EMERGENCY DEPT VISIT HI MDM: CPT | Performed by: EMERGENCY MEDICINE

## 2024-12-16 PROCEDURE — 73502 X-RAY EXAM HIP UNI 2-3 VIEWS: CPT

## 2024-12-16 PROCEDURE — 85025 COMPLETE CBC W/AUTO DIFF WBC: CPT | Performed by: EMERGENCY MEDICINE

## 2024-12-16 PROCEDURE — 81003 URINALYSIS AUTO W/O SCOPE: CPT | Performed by: EMERGENCY MEDICINE

## 2024-12-16 PROCEDURE — 99284 EMERGENCY DEPT VISIT MOD MDM: CPT

## 2024-12-16 PROCEDURE — 84443 ASSAY THYROID STIM HORMONE: CPT | Performed by: EMERGENCY MEDICINE

## 2024-12-16 RX ORDER — LIDOCAINE 50 MG/G
1 PATCH TOPICAL ONCE
Status: DISCONTINUED | OUTPATIENT
Start: 2024-12-16 | End: 2024-12-16 | Stop reason: HOSPADM

## 2024-12-16 RX ORDER — LIDOCAINE 50 MG/G
1 PATCH TOPICAL DAILY
Qty: 30 PATCH | Refills: 0 | Status: SHIPPED | OUTPATIENT
Start: 2024-12-16

## 2024-12-16 RX ORDER — MAGNESIUM SULFATE HEPTAHYDRATE 40 MG/ML
2 INJECTION, SOLUTION INTRAVENOUS ONCE
Status: COMPLETED | OUTPATIENT
Start: 2024-12-16 | End: 2024-12-16

## 2024-12-16 RX ORDER — HYDROCHLOROTHIAZIDE 12.5 MG/1
12.5 TABLET ORAL DAILY
Status: DISCONTINUED | OUTPATIENT
Start: 2024-12-16 | End: 2024-12-16 | Stop reason: HOSPADM

## 2024-12-16 RX ORDER — LISINOPRIL 20 MG/1
20 TABLET ORAL ONCE
Status: COMPLETED | OUTPATIENT
Start: 2024-12-16 | End: 2024-12-16

## 2024-12-16 RX ADMIN — MAGNESIUM SULFATE HEPTAHYDRATE 2 G: 40 INJECTION, SOLUTION INTRAVENOUS at 07:13

## 2024-12-16 RX ADMIN — SODIUM CHLORIDE 1000 ML: 0.9 INJECTION, SOLUTION INTRAVENOUS at 07:10

## 2024-12-16 RX ADMIN — LIDOCAINE 1 PATCH: 50 PATCH TOPICAL at 08:06

## 2024-12-16 RX ADMIN — HYDROCHLOROTHIAZIDE 12.5 MG: 12.5 TABLET ORAL at 09:58

## 2024-12-16 RX ADMIN — LISINOPRIL 20 MG: 20 TABLET ORAL at 09:58

## 2024-12-16 NOTE — ED PROVIDER NOTES
Time reflects when diagnosis was documented in both MDM as applicable and the Disposition within this note       Time User Action Codes Description Comment    12/16/2024  9:51 AM Arvin Thomas [M25.569] Knee pain     12/16/2024  9:52 AM Arvin Thomas Add [R51.9] Headache     12/16/2024  9:52 AM Arvin Thomas Add [E83.42] Hypomagnesemia           ED Disposition       ED Disposition   Discharge    Condition   Stable    Date/Time   Mon Dec 16, 2024  9:51 AM    Comment   Lucie James discharge to home/self care.                   Assessment & Plan       Medical Decision Making  1. Headache - Will give IV fluids, try Mg.     2. Back pain - Patient with ongoing issues with back pain, she denies any recent changes. Will x-ray hip, knees. Try lidocaine.    Problems Addressed:  Headache: chronic illness or injury  Hypomagnesemia: acute illness or injury  Knee pain: chronic illness or injury    Amount and/or Complexity of Data Reviewed  Labs: ordered.  Radiology: ordered and independent interpretation performed. Decision-making details documented in ED Course.    Risk  OTC drugs.  Prescription drug management.        ED Course as of 12/16/24 1546   Mon Dec 16, 2024   0915 X-ray(s) personally evaluated, interpretation: No fracture or dislocation demonstrated on plain films of hip and bilateral knees.   0948 Had in depth discussion with patient. She continues to have knee pain which appears chronic in nature. She has followed with orthopedics and received injections without improvement. Explained she should follow back up with orthopedics for further evaluation and management. Did ask whether patient was ok to return to home or if she would need to be evaluated for possible acute rehab. Patient states she would like to return home and she does have assistance. Did again offer additional evaluation potentially with admission which she has declined; Urged her to return for any concerns.        Medications   sodium  chloride 0.9 % bolus 1,000 mL (0 mL Intravenous Stopped 12/16/24 0934)   magnesium sulfate 2 g/50 mL IVPB (premix) 2 g (0 g Intravenous Stopped 12/16/24 0957)   lisinopril (ZESTRIL) tablet 20 mg (20 mg Oral Given 12/16/24 0958)       ED Risk Strat Scores                          SBIRT 20yo+      Flowsheet Row Most Recent Value   Initial Alcohol Screen: US AUDIT-C     1. How often do you have a drink containing alcohol? 0 Filed at: 12/16/2024 0621   2. How many drinks containing alcohol do you have on a typical day you are drinking?  0 Filed at: 12/16/2024 0621   3a. Male UNDER 65: How often do you have five or more drinks on one occasion? 0 Filed at: 12/16/2024 0621   3b. FEMALE Any Age, or MALE 65+: How often do you have 4 or more drinks on one occassion? 0 Filed at: 12/16/2024 0621   Audit-C Score 0 Filed at: 12/16/2024 0621   XIMENA: How many times in the past year have you...    Used an illegal drug or used a prescription medication for non-medical reasons? Never Filed at: 12/16/2024 0621                            History of Present Illness       Chief Complaint   Patient presents with    Headache     Pt states she has had ongoing headache x1 week now. Pt states she has had an ongoing cold. Pt took tylenol at home and states no relief.    Leg Pain     Pt reports L hip and L knee pain. Reports having back surgery 3-4 months ago. Denies recent falls. Took tylenol at home with no relief.        Past Medical History:   Diagnosis Date    Breast cancer (HCC)     Chronic midline low back pain with bilateral sciatica 06/22/2023    Dementia (HCC)     Environmental allergies 06/22/2023    Glaucoma 04/17/2022    Heart murmur     Hyperparathyroidism due to renal insufficiency (HCC) 04/19/2023    Malignant neoplasm of breast (female) (HCC) 03/30/2011    Mild cognitive impairment with memory loss 02/28/2018    Last Assessment & Plan:    Formatting of this note might be different from the original.   Worsening    Mild episode  of recurrent major depressive disorder (HCC) 2022    Primary hypertension 2022    Stage 3a chronic kidney disease (HCC) 10/22/2019    Last Assessment & Plan:    Formatting of this note is different from the original.   Lab Results    Component Value Date     GFRE 40 (L) 2023     GFRE 40 (L) 2022     GFRE 55 (L) 2022     NGFRC 52 (L) 2022     NGFRC 45 (L) 2021     NGFRC 38 (L) 2021    Stable    Type 2 diabetes mellitus, with long-term current use of insulin ([facility]) 2022      Past Surgical History:   Procedure Laterality Date    APPENDECTOMY       SECTION      HYSTERECTOMY      KIDNEY SURGERY      LUMBAR DISC SURGERY      THROAT SURGERY        Family History   Problem Relation Age of Onset    Hypertension Mother     Diabetes Mother     No Known Problems Father       Social History     Tobacco Use    Smoking status: Never    Smokeless tobacco: Never   Vaping Use    Vaping status: Never Used   Substance Use Topics    Alcohol use: Never    Drug use: Never      E-Cigarette/Vaping    E-Cigarette Use Never User       E-Cigarette/Vaping Substances    Nicotine No     THC No     CBD No     Flavoring No     Other No     Unknown No       I have reviewed and agree with the history as documented.     73 YO female presents with headache and Left back/leg pain. Patient states the headache has been present for the last week. This has been intermittent, mostly frontal, no known exacerbating or alleviating factors. Patient states she had previous headaches that were similar though it has been some time since she has had issues with headaches. She has had nasal congestion. States Left back pain has been an ongoing issues for the last few months, mostly constant. She has followed with orthopedics for this, states she had an injection 3 weeks prior that did not seem to help. She denies changes to this pain, states she has been able to ambulate though it does aggravate her pain.  She denies weakness or numbness, no fevers. She has been taking acetaminophen without relief. Has additionally B/L knee pain, denies injuries or recent falls. Patient lives alone. Pt denies CP/SOB/F/C/N/V/D/C, no dysuria, burning on urination or blood in urine.       History provided by:  Patient   used: No        Review of Systems   Constitutional:  Negative for chills, fatigue and fever.   HENT:  Negative for dental problem.    Eyes:  Negative for visual disturbance.   Respiratory:  Negative for shortness of breath.    Cardiovascular:  Negative for chest pain.   Gastrointestinal:  Negative for abdominal pain, diarrhea and vomiting.   Genitourinary:  Negative for dysuria and frequency.   Musculoskeletal:  Positive for arthralgias.   Skin:  Negative for rash.   Neurological:  Positive for headaches. Negative for dizziness, weakness and light-headedness.   Psychiatric/Behavioral:  Negative for agitation, behavioral problems and confusion.    All other systems reviewed and are negative.          Objective       ED Triage Vitals   Temperature Pulse Blood Pressure Respirations SpO2 Patient Position - Orthostatic VS   12/16/24 0619 12/16/24 0619 12/16/24 0619 12/16/24 0619 12/16/24 0619 12/16/24 0619   98.8 °F (37.1 °C) 79 (!) 179/77 20 100 % Lying      Temp Source Heart Rate Source BP Location FiO2 (%) Pain Score    12/16/24 0619 12/16/24 0619 12/16/24 0619 -- 12/16/24 0627    Oral Monitor Right arm  7      Vitals      Date and Time Temp Pulse SpO2 Resp BP Pain Score FACES Pain Rating User   12/16/24 0841 -- 86 98 % 18 192/98 -- -- JK   12/16/24 0630 -- 82 98 % 18 165/79 -- -- MM   12/16/24 0627 -- -- -- -- -- 7 -- MM   12/16/24 0619 98.8 °F (37.1 °C) 79 100 % 20 179/77 -- -- MM            Physical Exam  Vitals and nursing note reviewed.   Constitutional:       Appearance: Normal appearance.   HENT:      Head: Normocephalic and atraumatic.   Eyes:      Extraocular Movements: Extraocular movements  intact.      Conjunctiva/sclera: Conjunctivae normal.   Cardiovascular:      Rate and Rhythm: Normal rate and regular rhythm.      Pulses: Normal pulses.      Heart sounds: Normal heart sounds.   Pulmonary:      Effort: Pulmonary effort is normal.   Abdominal:      General: There is no distension.   Musculoskeletal:         General: Normal range of motion.      Cervical back: Normal range of motion.   Skin:     Findings: No rash.   Neurological:      General: No focal deficit present.      Mental Status: She is alert.      Cranial Nerves: No cranial nerve deficit.   Psychiatric:         Mood and Affect: Mood normal.         Results Reviewed       Procedure Component Value Units Date/Time    TSH, 3rd generation with Free T4 reflex [[id number]]  (Normal) Collected: 12/16/24 0712    Lab Status: Final result Specimen: Blood from Arm, Right Updated: 12/16/24 0916     TSH 3RD GENERATON 2.039 uIU/mL     UA (URINE) with reflex to Scope [[id number]] Collected: 12/16/24 0759    Lab Status: Final result Specimen: Urine, Clean Catch Updated: 12/16/24 0825     Color, UA Light Yellow     Clarity, UA Clear     Specific Gravity, UA 1.011     pH, UA 5.5     Leukocytes, UA Negative     Nitrite, UA Negative     Protein, UA Negative mg/dl      Glucose, UA Negative mg/dl      Ketones, UA Negative mg/dl      Urobilinogen, UA <2.0 mg/dl      Bilirubin, UA Negative     Occult Blood, UA Negative    Urine culture [[id number]] Collected: 12/16/24 0759    Lab Status: In process Specimen: Urine, Clean Catch Updated: 12/16/24 0802    Basic metabolic panel [[id number]]  (Abnormal) Collected: 12/16/24 0712    Lab Status: Final result Specimen: Blood from Arm, Right Updated: 12/16/24 0738     Sodium 136 mmol/L      Potassium 4.6 mmol/L      Chloride 104 mmol/L      CO2 25 mmol/L      ANION GAP 7 mmol/L      BUN 22 mg/dL      Creatinine 0.98 mg/dL      Glucose 199 mg/dL      Calcium 9.9 mg/dL      eGFR 56 ml/min/1.73sq m     Narrative:      National  Kidney Disease Foundation guidelines for Chronic Kidney Disease (CKD):     Stage 1 with normal or high GFR (GFR > 90 mL/min/1.73 square meters)    Stage 2 Mild CKD (GFR = 60-89 mL/min/1.73 square meters)    Stage 3A Moderate CKD (GFR = 45-59 mL/min/1.73 square meters)    Stage 3B Moderate CKD (GFR = 30-44 mL/min/1.73 square meters)    Stage 4 Severe CKD (GFR = 15-29 mL/min/1.73 square meters)    Stage 5 End Stage CKD (GFR <15 mL/min/1.73 square meters)  Note: GFR calculation is accurate only with a steady state creatinine    Hepatic function panel [[id number]]  (Normal) Collected: 12/16/24 0712    Lab Status: Final result Specimen: Blood from Arm, Right Updated: 12/16/24 0738     Total Bilirubin 0.41 mg/dL      Bilirubin, Direct 0.09 mg/dL      Alkaline Phosphatase 78 U/L      AST 18 U/L      ALT 17 U/L      Total Protein 7.7 g/dL      Albumin 3.9 g/dL     Magnesium [[id number]]  (Abnormal) Collected: 12/16/24 0712    Lab Status: Final result Specimen: Blood from Arm, Right Updated: 12/16/24 0738     Magnesium 1.1 mg/dL     CBC and differential [[id number]]  (Abnormal) Collected: 12/16/24 0712    Lab Status: Final result Specimen: Blood from Arm, Right Updated: 12/16/24 0722     WBC 8.90 Thousand/uL      RBC 3.57 Million/uL      Hemoglobin 11.2 g/dL      Hematocrit 33.2 %      MCV 93 fL      MCH 31.4 pg      MCHC 33.7 g/dL      RDW 13.1 %      MPV 9.6 fL      Platelets 322 Thousands/uL      nRBC 0 /100 WBCs      Segmented % 41 %      Immature Grans % 0 %      Lymphocytes % 45 %      Monocytes % 8 %      Eosinophils Relative 5 %      Basophils Relative 1 %      Absolute Neutrophils 3.69 Thousands/µL      Absolute Immature Grans 0.01 Thousand/uL      Absolute Lymphocytes 3.95 Thousands/µL      Absolute Monocytes 0.73 Thousand/µL      Eosinophils Absolute 0.46 Thousand/µL      Basophils Absolute 0.06 Thousands/µL             XR hip/pelv 2-3 vws left   ED Interpretation by Arvin Thomas MD (12/16 0914)   No fracture  or dislocation      Final Interpretation by Jae Lau MD (12/16 1006)      Mild arthritis of the hip. Some soft tissue calcification is noted. No acute fracture or dislocation         Computerized Assisted Algorithm (CAA) may have been used to analyze all applicable images.            Workstation performed: [id number]         XR knee 4+ views Right injury   ED Interpretation by Arvin Thomas MD (12/16 0915)   No fracture or dislocation        Final Interpretation by Jae Lau MD (12/16 1001)      Meniscal chondrocalcinosis. Very small joint effusion. No acute findings         Computerized Assisted Algorithm (CAA) may have been used to analyze all applicable images.         Workstation performed: [id number]         XR knee 4+ views left injury   ED Interpretation by Arvin Thomas MD (12/16 0915)   No fracture or dislocation        Final Interpretation by Jae Lau MD (12/16 1007)      Meniscal chondrocalcinosis with very small joint effusion. No acute findings         Computerized Assisted Algorithm (CAA) may have been used to analyze all applicable images.         Workstation performed: [id number]             Procedures    ED Medication and Procedure Management   Prior to Admission Medications   Prescriptions Last Dose Informant Patient Reported? Taking?   Cetirizine HCl (Zerviate) 0.24 % SOLN   No No   Sig: Apply 1 drop to eye in the morning   Continuous Glucose Sensor (FreeStyle Sneha 3 Sensor) MISC   No No   Sig: Use 1 each every 14 (fourteen) days   Insulin Glargine Solostar (Lantus SoloStar) 100 UNIT/ML SOPN   No No   Sig: Inject 0.22 mL (22 Units total) under the skin in the morning   Insulin Pen Needle 32G X 6 MM MISC   Yes No   Sig: Use qith solostar pen daily   Jardiance 25 MG TABS   Yes No   Sig: Take 1 tablet by mouth every morning   Mirabegron ER (Myrbetriq) 50 MG TB24   No No   Sig: Take 1 tablet (50 mg total) by mouth daily   Premarin vaginal cream    No No   Sig: INSERT 0.5 GRAMS INTO THE VAGINA DAILY   Restasis 0.05 % ophthalmic emulsion   Yes No   acetaminophen (TYLENOL) 650 mg CR tablet   No No   Sig: Take 1 tablet (650 mg total) by mouth every 8 (eight) hours as needed for mild pain   ammonium lactate (LAC-HYDRIN) 12 % cream   No No   Sig: Apply topically as needed for dry skin   atenolol (TENORMIN) 100 mg tablet   No No   Sig: Take 1 tablet (100 mg total) by mouth daily   azelastine (ASTELIN) 0.1 % nasal spray   Yes No   Si-2 sprays into each nostril 2 (two) times a day   calcium carbonate-vitamin D 500 mg-5 mcg per tablet   No No   Sig: Take 1 tablet by mouth daily with breakfast   ergocalciferol (ERGOCALCIFEROL) 1.25 MG (57453 UT) capsule   Yes No   Sig: Take 1 capsule by mouth once a week   fexofenadine (ALLEGRA) 60 MG tablet   No No   Sig: Take 1 tablet (60 mg total) by mouth daily   gabapentin (NEURONTIN) 400 mg capsule   No No   Sig: Take 1 capsule (400 mg total) by mouth 3 (three) times a day   glucose blood test strip   Yes No   Sig: Use Three times a day   latanoprost (XALATAN) 0.005 % ophthalmic solution   Yes No   Sig: Apply to eye   lisinopril-hydrochlorothiazide (PRINZIDE,ZESTORETIC) 20-12.5 MG per tablet   Yes No   Sig: Take 1 tablet by mouth daily   magnesium Oxide (MAG-OX) 400 mg TABS   Yes No   melatonin 3 mg   No No   Sig: Take 1 tablet (3 mg total) by mouth daily at bedtime   metFORMIN (GLUCOPHAGE-XR) 750 mg 24 hr tablet   Yes No   Sig: Take 750 mg by mouth   mirtazapine (REMERON) 15 mg tablet   No No   Sig: Take 1 tablet (15 mg total) by mouth daily at bedtime   montelukast (SINGULAIR) 10 mg tablet   Yes No   Sig: Take 10 mg by mouth daily   polyethylene glycol-propylene glycol (Systane) 0.4-0.3 %   No No   Sig: Apply 1 drop to eye every 3 (three) hours as needed (dry eyes)   rosuvastatin (CRESTOR) 10 MG tablet   Yes No   Sig: Take 10 mg by mouth   triamcinolone (KENALOG) 0.1 % cream   No No   Sig: Apply topically 2 (two) times a day       Facility-Administered Medications: None     Discharge Medication List as of 12/16/2024  9:58 AM        START taking these medications    Details   Diclofenac Sodium (VOLTAREN) 1 % Apply 2 g topically 4 (four) times a day, Starting Mon 12/16/2024, Normal      lidocaine (Lidoderm) 5 % Apply 1 patch topically over 12 hours daily Remove & Discard patch within 12 hours or as directed by MD, Starting Mon 12/16/2024, Normal      magnesium gluconate (MAGONATE) 500 mg tablet Take 1 tablet (500 mg total) by mouth 2 (two) times a day, Starting Mon 12/16/2024, Normal           CONTINUE these medications which have NOT CHANGED    Details   acetaminophen (TYLENOL) 650 mg CR tablet Take 1 tablet (650 mg total) by mouth every 8 (eight) hours as needed for mild pain, Starting Tue 11/5/2024, Normal      ammonium lactate (LAC-HYDRIN) 12 % cream Apply topically as needed for dry skin, Starting Mon 11/25/2024, Normal      atenolol (TENORMIN) 100 mg tablet Take 1 tablet (100 mg total) by mouth daily, Starting Mon 4/29/2024, Normal      azelastine (ASTELIN) 0.1 % nasal spray 1-2 sprays into each nostril 2 (two) times a day, Starting Mon 12/6/2021, Until Wed 7/24/2024, Historical Med      calcium carbonate-vitamin D 500 mg-5 mcg per tablet Take 1 tablet by mouth daily with breakfast, Starting Tue 11/26/2024, Normal      Cetirizine HCl (Zerviate) 0.24 % SOLN Apply 1 drop to eye in the morning, Starting Tue 9/24/2024, Normal      Continuous Glucose Sensor (FreeStyle Sneha 3 Sensor) [facility] Use 1 each every 14 (fourteen) days, Starting Tue 7/23/2024, Normal      ergocalciferol (ERGOCALCIFEROL) 1.25 MG (00841 UT) capsule Take 1 capsule by mouth once a week, Starting Wed 4/13/2022, Historical Med      fexofenadine (ALLEGRA) 60 MG tablet Take 1 tablet (60 mg total) by mouth daily, Starting Fri 12/13/2024, Normal      gabapentin (NEURONTIN) 400 mg capsule Take 1 capsule (400 mg total) by mouth 3 (three) times a day, Starting Mon 4/29/2024,  Until Sat 10/26/2024, Normal      glucose blood test strip Use Three times a day, Starting Mon 2/28/2022, Historical Med      Insulin Glargine Solostar (Lantus SoloStar) 100 UNIT/ML SOPN Inject 0.22 mL (22 Units total) under the skin in the morning, Starting Fri 8/23/2024, Normal      Insulin Pen Needle 32G X 6 MM MISC Use qith solostar pen daily, Historical Med      Jardiance 25 MG TABS Take 1 tablet by mouth every morning, Starting Thu 7/11/2024, Historical Med      latanoprost (XALATAN) 0.005 % ophthalmic solution Apply to eye, Starting Sun 7/24/2011, Historical Med      lisinopril-hydrochlorothiazide (PRINZIDE,ZESTORETIC) 20-12.5 MG per tablet Take 1 tablet by mouth daily, Starting Wed 4/13/2022, Until Wed 7/24/2024, Historical Med      magnesium Oxide (MAG-OX) 400 mg TABS Starting Tue 3/14/2023, Historical Med      melatonin 3 mg Take 1 tablet (3 mg total) by mouth daily at bedtime, Starting Tue 11/26/2024, Normal      metFORMIN (GLUCOPHAGE-XR) 750 mg 24 hr tablet Take 750 mg by mouth, Starting Mon 2/28/2022, Until Wed 7/24/2024 at 2359, Historical Med      Mirabegron ER (Myrbetriq) 50 MG TB24 Take 1 tablet (50 mg total) by mouth daily, Starting Tue 9/24/2024, Until Wed 9/24/2025, Normal      mirtazapine (REMERON) 15 mg tablet Take 1 tablet (15 mg total) by mouth daily at bedtime, Starting Tue 11/26/2024, Normal      montelukast (SINGULAIR) 10 mg tablet Take 10 mg by mouth daily, Starting Wed 4/13/2022, Historical Med      polyethylene glycol-propylene glycol (Systane) 0.4-0.3 % Apply 1 drop to eye every 3 (three) hours as needed (dry eyes), Starting Tue 9/24/2024, Until Wed 9/24/2025 at 2359, Normal      Premarin vaginal cream INSERT 0.5 GRAMS INTO THE VAGINA DAILY, Normal      Restasis 0.05 % ophthalmic emulsion Historical Med      rosuvastatin (CRESTOR) 10 MG tablet Take 10 mg by mouth, Starting Thu 7/11/2024, Until Fri 7/11/2025 at 2359, Historical Med      triamcinolone (KENALOG) 0.1 % cream Apply  topically 2 (two) times a day, Starting Wed 7/24/2024, Normal           No discharge procedures on file.  ED SEPSIS DOCUMENTATION   Time reflects when diagnosis was documented in both MDM as applicable and the Disposition within this note       Time User Action Codes Description Comment    12/16/2024  9:51 AM Arvin Thomas [M25.569] Knee pain     12/16/2024  9:52 AM Arvin Thomas [R51.9] Headache     12/16/2024  9:52 AM Arvin Thomas [E83.42] Hypomagnesemia                  Arvin Thomas MD  12/16/24 8870

## 2024-12-16 NOTE — DISCHARGE INSTRUCTIONS
Use the lidoderm patches, covering the painful area. This can be left in place for 12 hours, then it should be removed for 12 hours before another can be re-applied.    Apply the diclofenac four times daily to the knees.    Take the magnesium, speak with your family doctor about the low level. You should have a repeat magnesium in 1-2 weeks to make sure it is staying up.    Speak with your orthopedist about your continuing pain, they may have other options to help with your discomfort.    Return to the ER if your symptoms continue to worsen or if you have any concerns regarding your ability to care for yourself at home.    Utilice los parches de lidodermo, cubriendo la elyse dolorida.     Puede dejarlos colocados ambrose 12 horas y luego debe retirarlos ambrose 12 horas antes de volver a aplicar otro.    Aplique el diclofenaco cuatro veces al día en las rodillas.     Petrolia el magnesio y hable con pike médico de cabecera sobre el nivel bajo.     Debe repetir la dosis de magnesio en 1 o 2 semanas para asegurarse de que se mantenga alto.     Hable con pike ortopedista sobre el dolor persistente, es posible que tenga otras opciones para aliviar el malestar.     Regrese a la vidal de emergencias si namita síntomas continúan empeorando o si tiene alguna inquietud sobre pike capacidad para cuidar de sí mismo en casa.

## 2024-12-17 LAB — BACTERIA UR CULT: NORMAL

## 2024-12-18 ENCOUNTER — TELEPHONE (OUTPATIENT)
Dept: FAMILY MEDICINE CLINIC | Facility: CLINIC | Age: 74
End: 2024-12-18

## 2024-12-18 DIAGNOSIS — E83.42 HYPOMAGNESEMIA: ICD-10-CM

## 2024-12-18 NOTE — TELEPHONE ENCOUNTER
Pt call the office today asking if you can sent her potassium to the pharmacy, pt states she went to the ED and was told her potassium is low.

## 2024-12-30 ENCOUNTER — TELEPHONE (OUTPATIENT)
Dept: FAMILY MEDICINE CLINIC | Facility: CLINIC | Age: 74
End: 2024-12-30

## 2024-12-30 NOTE — TELEPHONE ENCOUNTER
----- Message from Sophie Juan MD sent at 12/2/2024 12:21 PM EST -----  Hi Dr. Peterson, Dr. Stokes and Margot,    I called patient to follow up, she confirmed is not following with any other outpt mental health office.  She is willing to seek assistance for the insomnia/depression.  She will await a call for appt with psych resident.  Thank you for you help, she mentioned again that she wants us to know that her recent suregery did not help with the back pain, she is feeling very depressed, no SI/HI.    Thanks,  Dr. Juan

## 2025-01-20 ENCOUNTER — OFFICE VISIT (OUTPATIENT)
Dept: FAMILY MEDICINE CLINIC | Facility: CLINIC | Age: 75
End: 2025-01-20

## 2025-01-20 VITALS
OXYGEN SATURATION: 98 % | SYSTOLIC BLOOD PRESSURE: 128 MMHG | HEART RATE: 89 BPM | RESPIRATION RATE: 18 BRPM | BODY MASS INDEX: 26.46 KG/M2 | TEMPERATURE: 98.9 F | DIASTOLIC BLOOD PRESSURE: 84 MMHG | WEIGHT: 155 LBS | HEIGHT: 64 IN

## 2025-01-20 DIAGNOSIS — Z02.89 ENCOUNTER FOR COMPLETION OF FORM WITH PATIENT: Primary | ICD-10-CM

## 2025-01-20 DIAGNOSIS — L21.9 SEBORRHEIC DERMATITIS: ICD-10-CM

## 2025-01-20 PROCEDURE — 99214 OFFICE O/P EST MOD 30 MIN: CPT | Performed by: FAMILY MEDICINE

## 2025-01-20 PROCEDURE — 3074F SYST BP LT 130 MM HG: CPT | Performed by: FAMILY MEDICINE

## 2025-01-20 PROCEDURE — 3079F DIAST BP 80-89 MM HG: CPT | Performed by: FAMILY MEDICINE

## 2025-01-20 RX ORDER — KETOCONAZOLE 20 MG/ML
1 SHAMPOO, SUSPENSION TOPICAL 2 TIMES WEEKLY
Qty: 120 ML | Refills: 2 | Status: SHIPPED | OUTPATIENT
Start: 2025-01-20

## 2025-01-20 NOTE — PATIENT INSTRUCTIONS
Recursos de Cirilo Mental    Si desea ser incluido en la lista de espera para los servicios de [facility], DEBE comunicarse con la admisión en [facility] - [phone]    Apoyo de Emergencia y Crisis    Línea de [place] para la Suicidio y Crisis: Llama o envía un mensaje de texto al [phone] (Disponible las 24 horas)  Línea de Texto de Crisis: Envía un mensaje de texto con la palabra HOME al [phone] (Disponible 24/7)  Línea de Apoyo: Llama al [phone] (Apoyo confidencial para la cirilo mental, disponible de lunes a robby: 6 AM-10 AM y 4 PM-12 AM)  Crisis del Condado de [place]: Llama al [phone] (Para emergencias de cirilo mental, o visita tu Departamento de Emergencias local)  Consejo de Víctimas de Crimen: Llama al [phone] (Línea de apoyo para víctimas, consejería, acompañamiento a tribunales y hospitales, servicios gratuitos)    Servicios Locales de Cirilo Mental    [facility]  [phone]  [email or web address]  Apoyo para condiciones de cirilo mental. Servicios gratuitos para todos    [place] [address] [place] [address] [phone]  Servicios para todas las edades; Bilingüe (Inglés/Español); Acepta Asistencia Médica, Medicare y seguros comerciales    [facility]  [address] [address] [phone]  Servicios para todas las edades; Bilingüe (Inglés/Español); Acepta Highmark Blue Cross Blue Shield, Magellan, Aetna, Optum y Cigna    [facility]  [address] [place] [address] [phone]  Servicios para mayores de 4 años. Solo inglés; NO acepta Asistencia Médica (solo seguros comerciales)    [facility]  [address] [address], [place]  [phone]  Servicios para todas las edades; Solo inglés; Acepta [facility] y Medicare [facility]  [address], [place]  [address]  [phone]  Servicios para mayores de 6 años. Bilingüe (Inglés/Español); Acepta solo Asistencia Médica    [facility]  [address] [place] [address]  [phone]  Servicios para mayores de 5 años. Bilingüe (Inglés/Español); Acepta Asistencia Médica    [facility]  [address] [place] [address]  [phone]  Servicios para mayores de 14 años. Bilingüe (Inglés/Español); Acepta Asistencia Médica, Medicare y seguros comerciales    [facility]  [address] [place] [address]  [phone]  Servicios para mayores de 5 años. Bilingüe (Inglés/Español); Acepta Asistencia Médica    [facility]  [address] [place] [address]  [phone]  Servicios para mayores de 3 años. Bilingüe (Inglés/Español); Acepta Asistencia Médica y algunos seguros comerciales    [facility]  [address], [address], [place] [address]  [phone]  Servicios para mayores de 5 años. Bilingüe (Inglés/Español); Acepta Asistencia Médica    [facility]  [address], [address], [place] [address]  [phone]  Servicios para mayores de 6 años. Bilingüe (Inglés/Español); Acepta Asistencia Médica y seguros comerciales    [facility]  [address] [place] [address]  [phone]  Servicios para mayores de 5 años. Bilingüe (Inglés/Español); Acepta Asistencia Médica, Medicare y seguros comerciales    [facility]  [address], [address], [place] [address]  [phone]  Servicios para todas las edades ([place]); 18+ (Psiquiatría); Solo inglés; Acepta Capital Blue Cross, Aetna, Highmark, Magellan, [facility] (CHIP y seguros comerciales)      [email or web address] es un recurso para encontrar proveedores de psicoterapia. Los pacientes pueden filtrar los resultados por idioma, especialidad, género, seguro y más. Se recomienda contactar a varios proveedores del directorio, ya que muchos tienen listas de espera y es importante  encontrar el adecuado para ti.    Por favor, contacte a pike proveedor de seguro para más información.

## 2025-01-20 NOTE — PROGRESS NOTES
"Name: Lucie Ramirez      : 1950      MRN: [id number]  Encounter Provider: Sophie Juan MD  Encounter Date: 2025   Encounter department: [facility]  :  Assessment & Plan  Encounter for completion of form with patient  Completed Medical Disability Citizenship Form with patient  Disability: Dementia, Cognitive Impairment, Severe Depression, and Insomnia       Seborrheic dermatitis    Orders:  •  ketoconazole (NIZORAL) 2 % shampoo; Apply 1 Application topically 2 (two) times a week           History of Present Illness   HPI  Lucie Ramirez is a 74 y.o. female  who presented to the office today to complete form for Medical Disability for [place] Citizenship application.  Pt has a diagnosis of Dementia, Mild Cognitive Impairment with Memory Loss, h/o MOCA of 15/30 and Geriatric Depression scale of 2/15 on .  Since then her Dementia and Depression have increased.  She is living alone, her daughter moved to [place] a few months ago.        The following portions of the patient's history were reviewed and updated as appropriate: allergies, current medications, past family history, past medical history, past social history, past surgical history and problem list.    Review of Systems   Constitutional:  Negative for fever.   HENT:  Negative for congestion.    Respiratory:  Negative for shortness of breath.    Cardiovascular:  Negative for chest pain.   Allergic/Immunologic: Positive for environmental allergies.   Neurological:  Positive for headaches.       Objective   /84 (BP Location: Left arm, Patient Position: Sitting, Cuff Size: Standard)   Pulse 89   Temp 98.9 °F (37.2 °C) (Temporal)   Resp 18   Ht 5' 4\" (1.626 m)   Wt 70.3 kg (155 lb)   SpO2 98%   BMI 26.61 kg/m²      Physical Exam  Vitals and nursing note reviewed.   Constitutional:       General: She is not in acute distress.     Appearance: She is well-developed.   HENT:      Head: Normocephalic " and atraumatic.   Eyes:      Conjunctiva/sclera: Conjunctivae normal.   Cardiovascular:      Rate and Rhythm: Normal rate and regular rhythm.   Pulmonary:      Effort: Pulmonary effort is normal. No respiratory distress.   Abdominal:      Palpations: Abdomen is soft.      Tenderness: There is no abdominal tenderness.   Musculoskeletal:         General: No swelling.      Cervical back: Neck supple.   Skin:     General: Skin is warm and dry.      Capillary Refill: Capillary refill takes less than 2 seconds.   Neurological:      Mental Status: She is alert and oriented to person, place, and time.   Psychiatric:         Mood and Affect: Mood is depressed.         Behavior: Behavior normal.       Administrative Statements   I have spent a total time of 60 minutes in caring for this patient on the day of the visit/encounter including  Completing USCIS Form .

## 2025-01-21 ENCOUNTER — TELEPHONE (OUTPATIENT)
Age: 75
End: 2025-01-21

## 2025-01-21 NOTE — TELEPHONE ENCOUNTER
Patient called in regard to wait list. Utilized  service. Writer confirmed patient is on MM wait list. Patient would like to be on TT wait list also. Writer added patient.     Patient has been added to the Talk Therapy wait list with a referral.    Insurance: [facility]  Insurance Type:    Commercial []   Medicaid [x]   [facility] (if applicable)   Medicare []  Location Preference: any  Provider Preference: any  Virtual: Yes [x] No []  Were outside resources sent: Yes [] No [x]

## 2025-02-03 ENCOUNTER — TELEPHONE (OUTPATIENT)
Dept: FAMILY MEDICINE CLINIC | Facility: CLINIC | Age: 75
End: 2025-02-03

## 2025-02-03 NOTE — TELEPHONE ENCOUNTER
PATIENT WOULD LIKE TO KNOW IF YOU CAN CALL SOMETHING IN TO PHARMACY FOR HER ITCHING ALL OVER HER BODY. PATIENT STATED SHE CAN NOT SLEEP.  PATIENT ALSO STATED SHE SPOKE TO THE DOCTOR REGARDING THIS ISSUE.      PLEASE ADVISE

## 2025-02-04 NOTE — TELEPHONE ENCOUNTER
Called Pt and states that they don't need any prescriptions. States they received their prescriptions from the pharmacy on 2/3/25 and started taking them.     States that the prescription for itching is short-lasting but that she will discuss further with PCP on their appt on 2/7/25

## 2025-02-05 ENCOUNTER — APPOINTMENT (OUTPATIENT)
Dept: LAB | Facility: HOSPITAL | Age: 75
End: 2025-02-05
Payer: COMMERCIAL

## 2025-02-05 ENCOUNTER — TELEPHONE (OUTPATIENT)
Dept: LAB | Facility: HOSPITAL | Age: 75
End: 2025-02-05

## 2025-02-05 DIAGNOSIS — E13.42 DIABETIC POLYNEUROPATHY ASSOCIATED WITH OTHER SPECIFIED DIABETES MELLITUS (HCC): Primary | ICD-10-CM

## 2025-02-05 LAB
EST. AVERAGE GLUCOSE BLD GHB EST-MCNC: 174 MG/DL
HBA1C MFR BLD: 7.7 %

## 2025-02-05 PROCEDURE — 36415 COLL VENOUS BLD VENIPUNCTURE: CPT

## 2025-02-05 PROCEDURE — 83036 HEMOGLOBIN GLYCOSYLATED A1C: CPT

## 2025-02-05 PROCEDURE — 80053 COMPREHEN METABOLIC PANEL: CPT

## 2025-02-06 LAB
ALBUMIN SERPL BCG-MCNC: 4 G/DL (ref 3.5–5)
ALP SERPL-CCNC: 71 U/L (ref 34–104)
ALT SERPL W P-5'-P-CCNC: 16 U/L (ref 7–52)
ANION GAP SERPL CALCULATED.3IONS-SCNC: 12 MMOL/L (ref 4–13)
AST SERPL W P-5'-P-CCNC: 25 U/L (ref 13–39)
BILIRUB SERPL-MCNC: 0.46 MG/DL (ref 0.2–1)
BUN SERPL-MCNC: 23 MG/DL (ref 5–25)
CALCIUM SERPL-MCNC: 9.8 MG/DL (ref 8.4–10.2)
CHLORIDE SERPL-SCNC: 104 MMOL/L (ref 96–108)
CO2 SERPL-SCNC: 22 MMOL/L (ref 21–32)
CREAT SERPL-MCNC: 1.04 MG/DL (ref 0.6–1.3)
GFR SERPL CREATININE-BSD FRML MDRD: 52 ML/MIN/1.73SQ M
GLUCOSE SERPL-MCNC: 171 MG/DL (ref 65–140)
POTASSIUM SERPL-SCNC: 5.3 MMOL/L (ref 3.5–5.3)
PROT SERPL-MCNC: 7.4 G/DL (ref 6.4–8.4)
SODIUM SERPL-SCNC: 138 MMOL/L (ref 135–147)

## 2025-02-07 ENCOUNTER — OFFICE VISIT (OUTPATIENT)
Dept: FAMILY MEDICINE CLINIC | Facility: CLINIC | Age: 75
End: 2025-02-07

## 2025-02-07 VITALS
HEART RATE: 75 BPM | SYSTOLIC BLOOD PRESSURE: 122 MMHG | RESPIRATION RATE: 18 BRPM | WEIGHT: 156 LBS | HEIGHT: 64 IN | TEMPERATURE: 98.9 F | DIASTOLIC BLOOD PRESSURE: 74 MMHG | BODY MASS INDEX: 26.63 KG/M2 | OXYGEN SATURATION: 98 %

## 2025-02-07 DIAGNOSIS — F03.A0 MILD DEMENTIA WITHOUT BEHAVIORAL DISTURBANCE, PSYCHOTIC DISTURBANCE, MOOD DISTURBANCE, OR ANXIETY, UNSPECIFIED DEMENTIA TYPE (HCC): ICD-10-CM

## 2025-02-07 DIAGNOSIS — F32.A DEPRESSIVE DISORDER: ICD-10-CM

## 2025-02-07 DIAGNOSIS — L29.9 PRURITUS: Primary | ICD-10-CM

## 2025-02-07 DIAGNOSIS — G47.00 INSOMNIA, UNSPECIFIED TYPE: ICD-10-CM

## 2025-02-07 PROCEDURE — 99214 OFFICE O/P EST MOD 30 MIN: CPT | Performed by: FAMILY MEDICINE

## 2025-02-07 PROCEDURE — 3078F DIAST BP <80 MM HG: CPT | Performed by: FAMILY MEDICINE

## 2025-02-07 PROCEDURE — 3074F SYST BP LT 130 MM HG: CPT | Performed by: FAMILY MEDICINE

## 2025-02-07 RX ORDER — ESCITALOPRAM OXALATE 10 MG/1
10 TABLET ORAL DAILY
Qty: 30 TABLET | Refills: 5 | Status: SHIPPED | OUTPATIENT
Start: 2025-02-07

## 2025-02-07 RX ORDER — TRAZODONE HYDROCHLORIDE 50 MG/1
50 TABLET ORAL
Qty: 30 TABLET | Refills: 2 | Status: SHIPPED | OUTPATIENT
Start: 2025-02-07 | End: 2025-02-25 | Stop reason: SDUPTHER

## 2025-02-07 NOTE — ASSESSMENT & PLAN NOTE
Orders:  •  escitalopram (Lexapro) 10 mg tablet; Take 1 tablet (10 mg total) by mouth daily  •  traZODone (DESYREL) 50 mg tablet; Take 1 tablet (50 mg total) by mouth daily at bedtime  •  Ambulatory Referral to Senior Care; Future

## 2025-02-07 NOTE — ASSESSMENT & PLAN NOTE
History of dementia, depression, anxiety, chronic pain and insomnia  Lengthy discussion with patient regarding her risk factors  Start Trazodone 50 mg at night  And Lexapro 10 mg in the evening  May continue Melatonin  Referred to Geriatrics  Discussed with pt the need  to discuss her with family her current conditions and need for assistance    Orders:  •  escitalopram (Lexapro) 10 mg tablet; Take 1 tablet (10 mg total) by mouth daily  •  traZODone (DESYREL) 50 mg tablet; Take 1 tablet (50 mg total) by mouth daily at bedtime  •  Ambulatory Referral to Senior Care; Future

## 2025-02-07 NOTE — PROGRESS NOTES
Name: Lucie Ramirez      : 1950      MRN: [id number]  Encounter Provider: Sophie Juan MD  Encounter Date: 2025   Encounter department: [facility]  :  Assessment & Plan  Pruritus  History of chronic generalized pruritus especially at night  Failed trial of antihistamines in the past  We will obtain a [facility] allergy panel  Advised to possibly start an elimination diet  Refer for an allergy consult    Orders:  •  Ambulatory Referral to Allergy; Future  •  [facility] Allergy Panel, Adult; Future    Insomnia, unspecified type  History of dementia, depression, anxiety, chronic pain and insomnia  Lengthy discussion with patient regarding her risk factors  Start Trazodone 50 mg at night  And Lexapro 10 mg in the evening  May continue Melatonin  Referred to Geriatrics  Discussed with pt the need  to discuss her with family her current conditions and need for assistance    Orders:  •  escitalopram (Lexapro) 10 mg tablet; Take 1 tablet (10 mg total) by mouth daily  •  traZODone (DESYREL) 50 mg tablet; Take 1 tablet (50 mg total) by mouth daily at bedtime  •  Ambulatory Referral to Senior Care; Future    Mild dementia without behavioral disturbance, psychotic disturbance, mood disturbance, or anxiety, unspecified dementia type (HCC)    Orders:  •  Ambulatory Referral to Senior Care; Future    Depressive disorder    Orders:  •  escitalopram (Lexapro) 10 mg tablet; Take 1 tablet (10 mg total) by mouth daily  •  traZODone (DESYREL) 50 mg tablet; Take 1 tablet (50 mg total) by mouth daily at bedtime  •  Ambulatory Referral to Senior Care; Future           History of Present Illness   HPI  Lucie Ramirez is a 74 y.o. female  who presented to the office today accompanied by her care giver.  Since the last office visit on 2025 she has not had any ER visits or hospitalizations.  Patient states today that she feels the same, is very depressed and sleeps poorly.  Patient  "states ever since her daughter moved to [place] she has been feeling more lonely.  She has a decreased appetite, and does not feel like her chronic pain is improving    Also patient states she has a chronic itchy sensation all over her body especially at night.  She feels like her scalp itches as well.  She uses Rosemary oil but this is not new and has been using it for many years.        Review of Systems   Constitutional:  Positive for activity change, appetite change and fatigue. Negative for fever.   HENT:  Negative for congestion.    Respiratory:  Negative for shortness of breath.    Cardiovascular:  Negative for chest pain.   Musculoskeletal:  Positive for arthralgias and back pain.   Allergic/Immunologic: Positive for environmental allergies.   Neurological:  Positive for headaches.   Psychiatric/Behavioral:  Positive for decreased concentration and sleep disturbance. Negative for self-injury. The patient is nervous/anxious.        Objective   /74 (BP Location: Right arm, Patient Position: Sitting, Cuff Size: Standard)   Pulse 75   Temp 98.9 °F (37.2 °C) (Temporal)   Resp 18   Ht 5' 4\" (1.626 m)   Wt 70.8 kg (156 lb)   SpO2 98%   BMI 26.78 kg/m²      Physical Exam  Vitals and nursing note reviewed.   Constitutional:       General: She is not in acute distress.     Appearance: She is well-developed.   HENT:      Head: Normocephalic and atraumatic.   Eyes:      Conjunctiva/sclera: Conjunctivae normal.   Cardiovascular:      Rate and Rhythm: Normal rate and regular rhythm.   Pulmonary:      Effort: Pulmonary effort is normal. No respiratory distress.   Abdominal:      Palpations: Abdomen is soft.      Tenderness: There is no abdominal tenderness.   Musculoskeletal:         General: No swelling.      Cervical back: Neck supple.   Skin:     General: Skin is warm and dry.      Capillary Refill: Capillary refill takes less than 2 seconds.      Comments: Mild erythematous macular rash on the scalp  Hair " is oily due to use of Rosemary oil   Neurological:      Mental Status: She is alert and oriented to person, place, and time.   Psychiatric:         Mood and Affect: Mood is depressed.         Behavior: Behavior normal.

## 2025-02-13 NOTE — ASSESSMENT & PLAN NOTE
Cancer Navigator Documentation     Cancer Navigator Patient Contact: In person     Progress Note:  Met with patient today in her inpatient room in [facility] to discuss Allogeneic Stem Cell Transplant and to complete the consenting process. Education provided included the process of donor collection, high dose chemotherapy, and stem cell transplant.      Patient attended education class on 2/10/2024.     Educated patient on allogeneic stem cell transplant. Educated patient on known risks and side effects of receiving high-dose chemotherapy. Discussed lafka-pfhfxh-mfhi disease in detail including, symptoms, immunosuppressive/preventative medications (tacrolimus, Cellcept and steroids) and treatment of GVHD. Explained the admission process, the conditioning therapy of high dose chemotherapy and stem cell rinfusion. Discussed the insertion of a central catheter and risk and benefits of this procedure. Discussed with the patient that a unit nurse will be at bedside the entire time of reinfusion and that pre-meds will be given. Discussed alternative treatments, costs of treatments, confidentiality, voluntary withdrawal, physician services and standards of care. Patient stated full understanding and signed Consent for High-Dose Chemotherapy with Allogeneic Stem Cell Transplant.   Went over hospitalization in detail with the patient including admission, exercise activities, neutropenic diet, potential complications, infection prevention, central line care, transfusion support, as well as discharge criteria and follow up. Patient educated on the importance of a care giver and a strong support system until at least day +100. Provided patient and with transplant diet, food safety guidelines, and exercise booklet handouts. Went over handouts in detail and instructed patient to bring handouts with on day of admission along with the transplant binder. All questions answered.  Introduced the Consent to Participate  GDS 2  Patient with history chronic depression  Currently maintained on Prozac, Lexapro  Continue social support by family and friends  No HI/SI  Would recommend referral to Psychiatry due to notable behaviors with dementia in [facility] Research Database Study. Discussed what the information was used for and how confidentiality is maintained throughout the study. Patient stated full understanding and signed Consent to Participate in [facility] Research Database Study. Patient also agreed to be contacted by [facility] via email at [email or web address] .   Discussed in detail the Consent to Take Part in a Human Research Study Contribution of a Blood Sample to the Research Sample Repository. Reviewed purpose and process of the research. Discussed confidentiality, HIPAA guidelines, risks and benefits. Patient stated full understanding however she refused to participate in the study. In addition to this, patient also signed the Authorization to Use and Disclose Information for Research Purposes.     Encouraged patient to call with any questions or concerns. Copy of consents provided to patient.         Kal Pak, JERODN, RN  Nurse Navigator, Bone Marrow Transplant Program  [facility]  [address] [place] [address]  [address]  Tel. [phone]  Fax [phone]

## 2025-02-14 ENCOUNTER — TELEPHONE (OUTPATIENT)
Age: 75
End: 2025-02-14

## 2025-02-14 NOTE — TELEPHONE ENCOUNTER
Patient called the  Senior Care Line requesting an appointment be made for her for the mobile lab.   Constantin #[id number] used during the call.  She would like a call back to let her know when it is scheduled for.  Thank you!

## 2025-02-17 ENCOUNTER — TELEPHONE (OUTPATIENT)
Dept: LAB | Facility: HOSPITAL | Age: 75
End: 2025-02-17

## 2025-02-25 ENCOUNTER — OFFICE VISIT (OUTPATIENT)
Dept: FAMILY MEDICINE CLINIC | Facility: CLINIC | Age: 75
End: 2025-02-25

## 2025-02-25 VITALS
OXYGEN SATURATION: 98 % | DIASTOLIC BLOOD PRESSURE: 72 MMHG | SYSTOLIC BLOOD PRESSURE: 110 MMHG | HEIGHT: 64 IN | WEIGHT: 154 LBS | HEART RATE: 76 BPM | TEMPERATURE: 97.9 F | RESPIRATION RATE: 16 BRPM | BODY MASS INDEX: 26.29 KG/M2

## 2025-02-25 DIAGNOSIS — Z00.00 ANNUAL PHYSICAL EXAM: Primary | ICD-10-CM

## 2025-02-25 DIAGNOSIS — Z12.31 ENCOUNTER FOR SCREENING MAMMOGRAM FOR BREAST CANCER: ICD-10-CM

## 2025-02-25 DIAGNOSIS — G47.00 INSOMNIA, UNSPECIFIED TYPE: ICD-10-CM

## 2025-02-25 DIAGNOSIS — F32.A DEPRESSIVE DISORDER: ICD-10-CM

## 2025-02-25 PROCEDURE — 3078F DIAST BP <80 MM HG: CPT | Performed by: FAMILY MEDICINE

## 2025-02-25 PROCEDURE — 3074F SYST BP LT 130 MM HG: CPT | Performed by: FAMILY MEDICINE

## 2025-02-25 PROCEDURE — 99397 PER PM REEVAL EST PAT 65+ YR: CPT | Performed by: FAMILY MEDICINE

## 2025-02-25 PROCEDURE — 99213 OFFICE O/P EST LOW 20 MIN: CPT | Performed by: FAMILY MEDICINE

## 2025-02-25 RX ORDER — TRAZODONE HYDROCHLORIDE 50 MG/1
50 TABLET ORAL
Qty: 30 TABLET | Refills: 2 | Status: SHIPPED | OUTPATIENT
Start: 2025-02-25

## 2025-02-25 NOTE — PROGRESS NOTES
Adult Annual Physical  Name: Lucie Ramirez      : 1950      MRN: [id number]  Encounter Provider: Sophie Juan MD  Encounter Date: 2025   Encounter department: [facility]    Assessment & Plan  Annual physical exam  Immunizations and preventive care screenings were discussed with patient today. Appropriate education was printed on patient's after visit summary.         Insomnia, unspecified type  History of dementia, depression, anxiety, chronic pain and insomnia  Improved symptoms today    Plan:  Continue Trazodone 50 mg at night and Lexapro 10 mg in the evening  Geriatrics referral pending    Orders:  •  traZODone (DESYREL) 50 mg tablet; Take 1 tablet (50 mg total) by mouth daily at bedtime    Depressive disorder    Orders:  •  traZODone (DESYREL) 50 mg tablet; Take 1 tablet (50 mg total) by mouth daily at bedtime    Encounter for screening mammogram for breast cancer    Orders:  •  Mammo diagnostic bilateral w 3d and cad; Future    Immunizations and preventive care screenings were discussed with patient today. Appropriate education was printed on patient's after visit summary.    Counseling:  Alcohol/drug use: discussed moderation in alcohol intake, the recommendations for healthy alcohol use, and avoidance of illicit drug use.  Dental Health: discussed importance of regular tooth brushing, flossing, and dental visits.  Injury prevention: discussed safety/seat belts, safety helmets, smoke detectors, carbon monoxide detectors, and smoking near bedding or upholstery.  Exercise: the importance of regular exercise/physical activity was discussed. Recommend exercise 3-5 times per week for at least 30 minutes.          History of Present Illness     Adult Annual Physical:  Patient presents for annual physical. Lucie Ramirez is a 74 y.o. female who presented to the office today for her annual physical exam.  Patient states that after starting Lexapro and  trazodone she has been feeling less anxious, for the past couple of weeks she has also been sleeping better.  The injections given by pain management also helped to alleviate her back pain.  She visited the urgent care due to the pruritus and was given a Medrol Dosepak, which she states increased her blood sugar but did relieve her pruritus.        The following portions of the patient's history were reviewed and updated as appropriate: allergies, current medications, past family history, past medical history, past social history, past surgical history and problem list.  .     Diet and Physical Activity:  - Diet/Nutrition: consuming 3-5 servings of fruits/vegetables daily, adequate whole grain intake and poor diet. eats two meals daily  - Exercise: no formal exercise.    General Health:  - Sleep: sleeps poorly and 1-3 hours of sleep on average. lately sleep has been better becasue the pruritis is better controlled  - Hearing: normal hearing bilateral ears.  - Vision: no vision problems and wears glasses. glasses to read  - Dental: no dental visits for > 1 year. wear dentures, and the bottom dentures irritate her gums so she does not wear them    /GYN Health:  - Follows with GYN: no.   - Menopause: postmenopausal.   - History of STDs: no    Advanced Care Planning:  - Has an advanced directive?: no    - Has a durable medical POA?: no    - ACP document given to patient?: yes      Review of Systems   Constitutional:  Negative for fever.   HENT:  Negative for congestion.    Respiratory:  Negative for shortness of breath.    Cardiovascular:  Negative for chest pain.   Musculoskeletal:  Positive for arthralgias and back pain.   Allergic/Immunologic: Positive for environmental allergies.   Neurological:  Negative for headaches.   Psychiatric/Behavioral:  Positive for decreased concentration and sleep disturbance. Negative for self-injury. The patient is not nervous/anxious.          Objective   /72 (BP Location:  "Right arm, Patient Position: Sitting, Cuff Size: Standard)   Pulse 76   Temp 97.9 °F (36.6 °C) (Temporal)   Resp 16   Ht 5' 4\" (1.626 m)   Wt 69.9 kg (154 lb)   SpO2 98%   BMI 26.43 kg/m²     Physical Exam  Vitals and nursing note reviewed.   Constitutional:       General: She is not in acute distress.     Appearance: She is well-developed.   HENT:      Head: Normocephalic and atraumatic.   Eyes:      Conjunctiva/sclera: Conjunctivae normal.   Cardiovascular:      Rate and Rhythm: Normal rate and regular rhythm.      Heart sounds: No murmur heard.  Pulmonary:      Effort: Pulmonary effort is normal. No respiratory distress.      Breath sounds: Normal breath sounds.   Abdominal:      Palpations: Abdomen is soft.      Tenderness: There is no abdominal tenderness.   Musculoskeletal:         General: No swelling.      Cervical back: Neck supple.   Skin:     General: Skin is warm and dry.      Capillary Refill: Capillary refill takes less than 2 seconds.   Neurological:      Mental Status: She is alert and oriented to person, place, and time.   Psychiatric:         Mood and Affect: Mood normal.         Behavior: Behavior normal.      Comments: Mood is improved today         "

## 2025-02-25 NOTE — ASSESSMENT & PLAN NOTE
Orders:  •  traZODone (DESYREL) 50 mg tablet; Take 1 tablet (50 mg total) by mouth daily at bedtime

## 2025-02-25 NOTE — ASSESSMENT & PLAN NOTE
History of dementia, depression, anxiety, chronic pain and insomnia  Improved symptoms today    Plan:  Continue Trazodone 50 mg at night and Lexapro 10 mg in the evening  Geriatrics referral pending    Orders:  •  traZODone (DESYREL) 50 mg tablet; Take 1 tablet (50 mg total) by mouth daily at bedtime

## 2025-02-25 NOTE — PATIENT INSTRUCTIONS
"Patient Education     Examen físico de rutina para adultos   Conceptos Básicos   Redactado por los médicos y editores de [employer]   ¿Qué es un examen físico? -- Un examen físico es tony consulta de rutina o \"revisión\" con pike médico. También se conoce jose \"consulta de bienestar\" o \"consulta preventiva\".  Ambrose cada consulta, el médico hará lo siguiente:   Preguntará por pike cirilo física y mental   Preguntará sobre namita hábitos, conductas y estilo de yusef   Le hará un examen   Le administrará las vacunas que morgan necesarias   Hablará con usted sobre cualquier medicina que tome   Le dará consejos sobre pike cirilo   Responderá namita preguntas  Hacerse revisiones periódicas es tony parte importante del cuidado de pike cirilo. Puede ayudar a pike médico a encontrar y tratar cualquier problema que tenga. Qamar también es importante para prevenir problemas de cirilo.  Un examen físico de rutina es diferente de tony \"consulta por enfermedad\". Tony consulta por enfermedad es cuando lo atiende un médico debido a un problema o inquietud de cirilo. Dado que los exámenes físicos se programan con anticipación, usted puede pensar en lo que quiere preguntarle al médico.  ¿Con qué frecuencia josefina hacerme un examen físico? -- Depende de pike edad y de pike estado de cirilo. En general, en el elvira de las personas mayores de 21 años:   Si tiene menos de 50 años, es posible que pueda hacerse un examen físico cada 3 años.   Si tiene 50 años o más, pike médico podría recomendarle un examen físico cada año.  Si tiene un padecimiento de cirilo crónico, mando jose diabetes o presión arterial ileana, pike médico probablemente querrá verlo con más frecuencia.  ¿Qué sucede ambrose un examen físico? -- En general, cada consulta incluirá:   Examen físico - El médico o enfermero revisará pike estatura, peso, frecuencia cardíaca y presión arterial. También le examinará los ojos y los oídos. Le preguntará cómo se siente y si tiene algún síntoma que le moleste.   Medicinas - Es tony " "buena idea llevar tony lista de todos las medicinas que yonatan cada vez que acude a la consulta médica. Pike médico le hablará sobre luz maria medicinas y responderá a luz maria preguntas. Dígale si tiene algún efecto secundario que le moleste. También debe informarle si tiene dificultades para pagar alguna de luz maria medicinas.   Hábitos y comportamientos - [place] incluye:   Pike dieta   [place] hábitos de ejercicio   Si fuma, dereck alcohol o consume drogas   Si es sexualmente activo   Si se siente seguro en casa  Pike médico hablará con usted sobre las cosas que puede hacer para mejorar pike cirilo y reducir el riesgo de tener problemas de cirilo. También ofrecerá ayuda y apoyo. Por ejemplo, si quiere dejar de fumar, puede darle consejos y recetarle medicinas. Si quiere mejorar pike alimentación o realizar más actividad física, pike médico también puede ayudarlo a lograr estos objetivos.   Pruebas de laboratorio, si son necesarias - Las pruebas que le realicen dependerán de pike edad y situación. Por ejemplo, es posible que pike médico quiera revisar pike:   Colesterol   Azúcar en johan   Nivel de tian   Vacunas - Las vacunas recomendadas dependerán de pike edad, pike cirilo y de las vacunas que ya haya recibido. Las vacunas son muy importantes porque pueden prevenir ciertas infecciones graves o mortales.   Análisis sobre las pruebas de detección - \"Detección\" significa revisar si hay enfermedades u otros problemas de cirilo antes de que causen síntomas. Pike médico puede recomendar pruebas de detección según pike edad, riesgo y preferencias. [place] podría incluir pruebas para detectar:   Cáncer, jose cáncer de seno, próstata, michael uterino, ovario, colorrectal, próstata, pulmón o piel   Infecciones de transmisión sexual tales jose clamidia y gonorrea   Padecimientos de cirilo mental tales jose depresión y ansiedad.  El médico le hablará sobre los diferentes tipos de pruebas de detección. Puede ayudarlo a decidir qué pruebas de detección debe hacerse. También le puede " explicar lo que podrían significar los resultados.   Responder preguntas - El examen físico es un buen momento para hacerle preguntas al médico o enfermero sobre pike cirilo. Si es necesario, también puede derivarlo a otros médicos o especialistas.  Los adultos mayores de 65 años a menudo también necesitan otros cuidados. A medida que envejece, pike médico hablará con usted sobre:   Cómo evitar las caídas en el hogar   Pruebas de audición o visión   Pruebas de memoria   Cómo jesi namita medicinas de manera perez   Asegurarse de tener la ayuda y el apoyo que necesita en casa  Todos los artículos se actualizan a medida que se descubre nueva evidencia y culmina nuestro proceso de evaluación por homólogos   Marlen artículo se recuperó de UpToDate el: May 02, 2024.  Artículo 416128 Versión [id number].es-419.1  Release: 32.4.3 - C32.122  © 2024 [employer]Date[employer] Inc. Todos los derechos reservados.  Exención de responsabilidad y uso de la información del consumidor   Descargo de responsabilidad: esta información generalizada es un resumen limitado de información sobre el diagnóstico, el tratamiento y/o los medicamentos. No pretende ser exhaustiva y se debe utilizar jose herramienta para ayudar al usuario a comprender y/o evaluar las posibles opciones de diagnóstico y tratamiento. No incluye toda la información sobre afecciones, tratamientos, medicamentos, efectos secundarios o riesgos puedan ser aplicables a un paciente específico. No tiene el propósito de servir jose recomendación médica ni de sustituir la recomendación médica, el diagnóstico o el tratamiento de un profesional de atención médica que se base en el examen y la evaluación de marlen profesional de la cirilo respecto a las circunstancias específicas y únicas del paciente. Los pacientes deben hablar con un profesional de atención médica para obtener información completa sobre pike cirilo, cuestiones médicas y opciones de tratamiento, incluidos los riesgos o los beneficios relacionados con  el uso de medicamentos. Esta información no certifica que los tratamientos o medicamentos morgan seguros, eficaces o estén aprobados para tratar a un paciente específico. [employer] y namita afiliados renuncian a cualquier garantía o responsabilidad relacionada con esta información o el uso de la misma.El uso de esta información está sujeto a las Condiciones de uso, disponibles en https://www.[employer].com/en/know/clinical-effectiveness-terms. 2024© [employer] y namita afiliados y/o licenciantes. Todos los derechos reservados.  Copyright   © 2024 [employer] Todos los derechos reservados.

## 2025-02-28 ENCOUNTER — APPOINTMENT (OUTPATIENT)
Dept: LAB | Facility: HOSPITAL | Age: 75
End: 2025-02-28
Attending: FAMILY MEDICINE
Payer: COMMERCIAL

## 2025-02-28 DIAGNOSIS — L29.9 PRURITUS: ICD-10-CM

## 2025-02-28 PROCEDURE — 36415 COLL VENOUS BLD VENIPUNCTURE: CPT

## 2025-02-28 PROCEDURE — 82785 ASSAY OF IGE: CPT

## 2025-02-28 PROCEDURE — 86003 ALLG SPEC IGE CRUDE XTRC EA: CPT

## 2025-03-03 LAB
A ALTERNATA IGE QN: <0.1 KUA/I (ref 0–0.1)
A FUMIGATUS IGE QN: <0.1 KUA/I (ref 0–0.1)
BERMUDA GRASS IGE QN: 0.38 KUA/I (ref 0–0.1)
BOXELDER IGE QN: 0.33 KUA/I (ref 0–0.1)
C HERBARUM IGE QN: <0.1 KUA/I (ref 0–0.1)
CAT DANDER IGE QN: <0.1 KUA/I (ref 0–0.1)
CMN PIGWEED IGE QN: 0.32 KUA/I (ref 0–0.1)
COMMON RAGWEED IGE QN: 0.45 KUA/I (ref 0–0.1)
COTTONWOOD IGE QN: 0.24 KUA/I (ref 0–0.1)
D FARINAE IGE QN: <0.1 KUA/I (ref 0–0.1)
D PTERONYSS IGE QN: <0.1 KUA/I (ref 0–0.1)
DOG DANDER IGE QN: <0.1 KUA/I (ref 0–0.1)
LONDON PLANE IGE QN: 0.34 KUA/I (ref 0–0.1)
MOUSE URINE PROT IGE QN: <0.1 KUA/I (ref 0–0.1)
MT JUNIPER IGE QN: <0.1 KUA/I (ref 0–0.1)
MUGWORT IGE QN: <0.1 KUA/I (ref 0–0.1)
P NOTATUM IGE QN: <0.1 KUA/I (ref 0–0.1)
ROACH IGE QN: <0.1 KUA/I (ref 0–0.1)
SHEEP SORREL IGE QN: 0.55 KUA/I (ref 0–0.1)
SILVER BIRCH IGE QN: 0.35 KUA/I (ref 0–0.1)
TIMOTHY IGE QN: <0.1 KUA/I (ref 0–0.1)
TOTAL IGE SMQN RAST: 20.9 KU/L (ref 0–113)
WALNUT IGE QN: 0.63 KUA/I (ref 0–0.1)
WHITE ASH IGE QN: 0.47 KUA/I (ref 0–0.1)
WHITE ELM IGE QN: 0.32 KUA/I (ref 0–0.1)
WHITE MULBERRY IGE QN: <0.1 KUA/I (ref 0–0.1)
WHITE OAK IGE QN: <0.1 KUA/I (ref 0–0.1)

## 2025-03-28 NOTE — PROGRESS NOTES
[facility]  [address], [address]  [place] [address]  [phone]      GERIATRIC EVALUATION - ASSESSMENT & PLAN:      1. Mild dementia without behavioral disturbance, psychotic disturbance, mood disturbance, or anxiety, unspecified dementia type (HCC)  Assessment & Plan:  History of known dementia, unclear on severity prior to evaluation. After evaluation, diagnosis is consistent with mild stages of Alzheimer's Dementia.   Staging: Mild Stage Dementia  Decision-making capacity: May have limitations in complex medical or financial decisions.  Consider neuropsychology evaluation for further determination  Caregiver Review: No needs at this time, did educate if needs arise, SW can help with any increasing needs within the home and for further options for care.   Safety:  Medication Review: Medications reviewed and appropriate for chronic conditions.   Driving: Not driving.  Fall Risk: Medium fall risk  ACP Review: Educated the need for POA and Living Will due to ongoing memory concerns.   Will order labs.   Plan to follow up at scheduled care conference.  Orders:  -     Ambulatory Referral to correction  -     Vitamin B12/Folate, Serum Panel; Future; Expected date: 05/04/2025  -     TSH, 3rd generation with Free T4 reflex; Future; Expected date: 05/04/2025  2. SOB (shortness of breath)  Assessment & Plan:  Patient reports recent viral illness, with residual SOB and slight chest pain with inspiration.   Lungs are clear, no abnormal breath sounds.   Due to pain, will order CXR for evaluation.   Orders:  -     XR chest pa and lateral; Future; Expected date: 04/04/2025  3. Primary hypertension  Assessment & Plan:  Stable.   Continues on Atenolol 100 mg daily for BP management.   Denies any dizziness or headaches.   Continue to follow up with PCP for BP management.   4. Depressive disorder  Assessment & Plan:  Patient is very pleasant today in office.   Does report feeling very  depressed due to her pain, and issues with sleeping at times.   Continues on Lexapro, Trazodone for mood stabilization. Will increase Lexapro to 20 mg daily and monitor for improvement.   Denies SI.   Patient deferred need for talk therapy at this time.   Encouraged stress relief techniques, relaxation, and emotional support.   Orders:  -     Ambulatory Referral to [facility] Care  -     escitalopram (Lexapro) 10 mg tablet; Take 2 tablets (20 mg total) by mouth daily  5. Primary insomnia  Assessment & Plan:  Patient reports not sleeping well at nighttime, but has been improving.   Continues on Trazodone for anxiety and insomnia.   Encouraged good sleep hygiene, avoid daytime napping, avoiding OTC medications that may cause oversedation/increased confusion.   6. Chronic midline low back pain with bilateral sciatica  Assessment & Plan:  Endorses some pain and discomfort in the lower back.   Patient reports ambulating without any assistive devices at this time.   Denies any recent falls.   PT/OT deferred at this time.   Continues Tylenol, Voltaren for pain relief.   Plan to continue to follow with pain management for further recommendations. Can order PT/OT services in the future if needed.   7. Insomnia, unspecified type  Assessment & Plan:  Patient reports not sleeping well at nighttime, but has been improving.   Continues on Trazodone for anxiety and insomnia.   Encouraged good sleep hygiene, avoid daytime napping, avoiding OTC medications that may cause oversedation/increased confusion.   Orders:  -     Ambulatory Referral to Senior Care  -     escitalopram (Lexapro) 10 mg tablet; Take 2 tablets (20 mg total) by mouth daily      HPI     We had the pleasure of evaluating Lucie Ramirez who is a 74 y.o. female in Geriatric consultation today, along with her daughter, Lucie, to provide further history. Patient has been diagnosed with dementia in the past with unknown severity. Daughter has been noticing gradual  increased issues with her memory and anxiety over the past few months compared to baseline. She currently has assistance with her family majority of the day, she is only alone at nighttime in her apartment. They do not have any concerns with her being alone at this time, and their goal is to keep her as independent as possible for now. They do not have any other concerns at this time.     COGNITION:  Memory Issues noticed since 1 year(s)    Memory affected: short term memory loss    Symptoms started: gradual  Over time the memory has: worsened  Memory issue(s) were noted by: patient and family   Difficulty finding the right word while speaking: Yes  Requires repeat information or asking the same question repeatedly: Yes  Fluctuation in alertness: No  Changes in mood or personality: Yes  Seems anxious: Yes  Seems depressed: No  With suicidal ideation: No    Current or previous treatment for depression or anxiety: Yes    Family member with dementia and what type? No   History of head trauma: No  History of stroke: No  History of alcohol or substance abuse: No    FUNCTIONAL STATUS:  BADLs  Does patient require assistance with:  Bathing: Yes  Dressing: Yes  Transferring: No  Continence: No  Toileting: No  Feeding: No    IADLs  Does patient require assistance with:  Telephone: No  Shopping: No  Food Preparation: Yes   Housekeeping: Yes  Laundry: Yes  Transportation: Yes  Medications: No   Finances: No    NEUROPSYCH SYMPTOMS:  Is patient less interested in his or her usual activities or in the activities and plans of others? No  Does patient get angry or hostile?  Resist care from others? No  Does patient act impatient and cranky? Does mood frequently change for no apparent reason? Yes  Does patient have trouble sleeping at night? Yes   Does patient see or hear things that no one else can see or hear? No  Does patient act suspicious without good reason (example: believes that others are stealing from him or her, or  planning to harm him or her in some way)? No    SAFETY:  Hearing issue: Yes  Vision issue: Yes  Any gait or balance disorder: No  Uses: No Assistive Devices  Any falls in the last year: No  Any history of wandering: No  Are there firearms or guns in the home: No  Does patient drive: No  POA papers completed: No      Allergies   Allergen Reactions    Aspirin Other (See Comments) and Shortness Of Breath     eye, throat problems    Diphenhydramine Shortness Of Breath    Shellfish Allergy - Food Allergy Shortness Of Breath    Ibuprofen Other (See Comments)     swelling, itchy eyes       Medications:    Current Outpatient Medications on File Prior to Visit   Medication Sig Dispense Refill    acetaminophen (TYLENOL) 650 mg CR tablet Take 1 tablet (650 mg total) by mouth every 8 (eight) hours as needed for mild pain 30 tablet 5    ammonium lactate (LAC-HYDRIN) 12 % cream Apply topically as needed for dry skin 385 g 0    atenolol (TENORMIN) 100 mg tablet Take 1 tablet (100 mg total) by mouth daily 90 tablet 1    calcium carbonate-vitamin D 500 mg-5 mcg per tablet Take 1 tablet by mouth daily with breakfast 30 tablet 5    Cetirizine HCl (Zerviate) 0.24 % SOLN Apply 1 drop to eye in the morning 30 each 5    Continuous Glucose Sensor (FreeStyle Sneha 3 Sensor) MISC Use 1 each every 14 (fourteen) days 6 each 3    Diclofenac Sodium (VOLTAREN) 1 % Apply 2 g topically 4 (four) times a day 50 g 0    ergocalciferol (ERGOCALCIFEROL) 1.25 MG (76415 UT) capsule Take 1 capsule by mouth once a week      fexofenadine (ALLEGRA) 60 MG tablet Take 1 tablet (60 mg total) by mouth daily 30 tablet 5    glucose blood test strip Use Three times a day      Insulin Glargine Solostar (Lantus SoloStar) 100 UNIT/ML SOPN Inject 0.22 mL (22 Units total) under the skin in the morning 15 mL 2    Insulin Pen Needle 32G X 6 MM MISC Use qith solostar pen daily      Jardiance 25 MG TABS Take 1 tablet by mouth every morning      ketoconazole (NIZORAL) 2 %  shampoo Apply 1 Application topically 2 (two) times a week 120 mL 2    latanoprost (XALATAN) 0.005 % ophthalmic solution Apply to eye      lidocaine (Lidoderm) 5 % Apply 1 patch topically over 12 hours daily Remove & Discard patch within 12 hours or as directed by MD 30 patch 0    magnesium gluconate (MAGONATE) 500 mg tablet Take 1 tablet (500 mg total) by mouth 2 (two) times a day 60 tablet 0    magnesium Oxide (MAG-OX) 400 mg TABS       melatonin 3 mg Take 1 tablet (3 mg total) by mouth daily at bedtime 30 tablet 5    Mirabegron ER (Myrbetriq) 50 MG TB24 Take 1 tablet (50 mg total) by mouth daily 30 tablet 11    montelukast (SINGULAIR) 10 mg tablet Take 10 mg by mouth daily      polyethylene glycol-propylene glycol (Systane) 0.4-0.3 % Apply 1 drop to eye every 3 (three) hours as needed (dry eyes) 146 mL 0    Premarin vaginal cream INSERT 0.5 GRAMS INTO THE VAGINA DAILY 30 g 2    Restasis 0.05 % ophthalmic emulsion       rosuvastatin (CRESTOR) 10 MG tablet Take 10 mg by mouth      traZODone (DESYREL) 50 mg tablet Take 1 tablet (50 mg total) by mouth daily at bedtime 30 tablet 2    triamcinolone (KENALOG) 0.1 % cream Apply topically 2 (two) times a day 30 g 1    [DISCONTINUED] escitalopram (Lexapro) 10 mg tablet Take 1 tablet (10 mg total) by mouth daily 30 tablet 5    azelastine (ASTELIN) 0.1 % nasal spray 1-2 sprays into each nostril 2 (two) times a day      gabapentin (NEURONTIN) 400 mg capsule Take 1 capsule (400 mg total) by mouth 3 (three) times a day 90 capsule 5    lisinopril-hydrochlorothiazide (PRINZIDE,ZESTORETIC) 20-12.5 MG per tablet Take 1 tablet by mouth daily      metFORMIN (GLUCOPHAGE-XR) 750 mg 24 hr tablet Take 750 mg by mouth       No current facility-administered medications on file prior to visit.       History:  Past Medical History:   Diagnosis Date    Breast cancer (HCC)     Chronic midline low back pain with bilateral sciatica 06/22/2023    Dementia (HCC)     Environmental allergies  2023    Glaucoma 2022    Heart murmur     Hyperparathyroidism due to renal insufficiency ([facility]) 2023    Malignant neoplasm of breast (female) ([facility]) 2011    Mild cognitive impairment with memory loss 2018    Last Assessment & Plan:    Formatting of this note might be different from the original.   Worsening    Mild episode of recurrent major depressive disorder ([facility]) 2022    Primary hypertension 2022    Stage 3a chronic kidney disease ([facility]) 10/22/2019    Last Assessment & Plan:    Formatting of this note is different from the original.   Lab Results    Component Value Date     GFRE 40 (L) 2023     GFRE 40 (L) 2022     GFRE 55 (L) 2022     NGFRC 52 (L) 2022     NGFRC 45 (L) 2021     NGFRC 38 (L) 2021    Stable    Type 2 diabetes mellitus, with long-term current use of insulin ([facility]) 2022     Past Surgical History:   Procedure Laterality Date    APPENDECTOMY       SECTION      HYSTERECTOMY      KIDNEY SURGERY      LUMBAR DISC SURGERY      THROAT SURGERY       Family History   Problem Relation Age of Onset    Hypertension Mother     Diabetes Mother     No Known Problems Father      Social History     Socioeconomic History    Marital status:      Spouse name: Not on file    Number of children: 3    Years of education: Not on file    Highest education level: 11th grade   Occupational History    Not on file   Tobacco Use    Smoking status: Never     Passive exposure: Never    Smokeless tobacco: Never   Vaping Use    Vaping status: Never Used   Substance and Sexual Activity    Alcohol use: Never    Drug use: Never    Sexual activity: Not on file   Other Topics Concern    Not on file   Social History Narrative    Resides alone in an apartment    No pets    Occupation: retired    Grew up in [place]     Social Drivers of Health     Financial Resource Strain: Low Risk  (2024)    Overall Financial Resource Strain  (CARDIA)     Difficulty of Paying Living Expenses: Not very hard   Food Insecurity: No Food Insecurity (2024)    Nursing - Inadequate Food Risk Classification     Worried About Running Out of Food in the Last Year: Never true     Ran Out of Food in the Last Year: Never true     Ran Out of Food in the Last Year: Not on file   Transportation Needs: No Transportation Needs (2024)    PRAPARE - Transportation     Lack of Transportation (Medical): No     Lack of Transportation (Non-Medical): No   Physical Activity: Not on file   Stress: Not on file   Social Connections: Not on file   Intimate Partner Violence: Not At Risk (2024)    [facility]    Humiliation, Afraid, Rape, and Kick questionnaire     Fear of Current or Ex-Partner: No     Emotionally Abused: No     Physically Abused: No     Sexually Abused: No   Housing Stability: Low Risk  (2024)    Received from [facility]    Housing Stability Vital Sign     Unable to Pay for Housing in the Last Year: No     Number of Times Moved in the Last Year: 1     Homeless in the Last Year: No     Past Surgical History:   Procedure Laterality Date    APPENDECTOMY       SECTION      HYSTERECTOMY      KIDNEY SURGERY      LUMBAR DISC SURGERY      THROAT SURGERY           SUBJECTIVE     Review of Systems   Constitutional:  Positive for fatigue (recent viral illness, but getting better).   HENT:  Positive for congestion.    Respiratory:  Positive for shortness of breath (intermittent with some pain on inspiration).    Musculoskeletal:  Positive for back pain (sciatica) and gait problem.   Neurological:  Positive for weakness.   All other systems reviewed and are negative.      OBJECTIVE     Vitals:  Vitals:    25 1430   BP: 98/74   Pulse: 71   Temp: 98 °F (36.7 °C)   SpO2: 98%        Physical Exam  Observed Ambulation:  independent with no assistive devices.   Physical  Exam  Constitutional:       General: She is not in acute distress.     Appearance: Normal appearance. She is normal weight. She is not ill-appearing.   HENT:      Head: Normocephalic and atraumatic.   Cardiovascular:      Rate and Rhythm: Normal rate and regular rhythm.      Pulses: Normal pulses.      Heart sounds: Normal heart sounds. No murmur heard.  Pulmonary:      Effort: Pulmonary effort is normal. No respiratory distress.      Breath sounds: Normal breath sounds. No wheezing.   Abdominal:      General: Bowel sounds are normal. There is no distension.      Palpations: Abdomen is soft.      Tenderness: There is no abdominal tenderness.   Musculoskeletal:         General: Normal range of motion.      Cervical back: Normal range of motion and neck supple.   Skin:     General: Skin is warm and dry.      Capillary Refill: Capillary refill takes less than 2 seconds.   Neurological:      Mental Status: She is alert.      Motor: Weakness present.      Gait: Gait abnormal.   Psychiatric:         Mood and Affect: Mood normal.         Behavior: Behavior normal.         MoCA: 18/30  GDS: 10/15    Labs & Imaging:  Lab Results   Component Value Date    [id number] 578 04/28/2022     Lab Results   Component Value Date    IEL4WERGYSMV 2.039 12/16/2024    TSH 0.90 10/05/2024           I have spent a total time of 62 minutes in caring for this patient on the day of the visit/encounter including Diagnostic results, Prognosis, Risks and benefits of tx options, Instructions for management, Patient and family education, Risk factor reductions, Impressions, Counseling / Coordination of care, Documenting in the medical record, Reviewing/placing orders in the medical record (including tests, medications, and/or procedures), and Obtaining or reviewing history  .      AG Angelo  04/04/25

## 2025-04-01 NOTE — ASSESSMENT & PLAN NOTE
Stable.   Continues on Atenolol 100 mg daily for BP management.   Denies any dizziness or headaches.   Continue to follow up with PCP for BP management.

## 2025-04-01 NOTE — ASSESSMENT & PLAN NOTE
Chronic.   Patient reports ambulating ---.   Denies/recent falls.   PT/OT ---.   Continues Tylenol, Voltaren for pain relief.    Cheek-To-Nose Interpolation Flap Text: A decision was made to reconstruct the defect utilizing an interpolation axial flap and a staged reconstruction.  A telfa template was made of the defect.  This telfa template was then used to outline the Cheek-To-Nose Interpolation flap.  The donor area for the pedicle flap was then injected with anesthesia.  The flap was excised through the skin and subcutaneous tissue down to the layer of the underlying musculature.  The interpolation flap was carefully excised within this deep plane to maintain its blood supply.  The edges of the donor site were undermined.   The donor site was closed in a primary fashion.  The pedicle was then rotated into position and sutured.  Once the tube was sutured into place, adequate blood supply was confirmed with blanching and refill.  The pedicle was then wrapped with xeroform gauze and dressed appropriately with a telfa and gauze bandage to ensure continued blood supply and protect the attached pedicle.

## 2025-04-01 NOTE — ASSESSMENT & PLAN NOTE
Endorses some pain and discomfort in the lower back.   Patient reports ambulating without any assistive devices at this time.   Denies any recent falls.   PT/OT deferred at this time.   Continues Tylenol, Voltaren for pain relief.   Plan to continue to follow with pain management for further recommendations. Can order PT/OT services in the future if needed.

## 2025-04-01 NOTE — ASSESSMENT & PLAN NOTE
Patient reports not sleeping well at nighttime, but has been improving.   Continues on Trazodone for anxiety and insomnia.   Encouraged good sleep hygiene, avoid daytime napping, avoiding OTC medications that may cause oversedation/increased confusion.

## 2025-04-01 NOTE — ASSESSMENT & PLAN NOTE
Patient is very pleasant today in office.   Does report feeling very depressed due to her pain, and issues with sleeping at times.   Continues on Lexapro, Trazodone for mood stabilization. Will increase Lexapro to 20 mg daily and monitor for improvement.   Denies SI.   Patient deferred need for talk therapy at this time.   Encouraged stress relief techniques, relaxation, and emotional support.

## 2025-04-04 ENCOUNTER — CONSULT (OUTPATIENT)
Age: 75
End: 2025-04-04
Payer: COMMERCIAL

## 2025-04-04 VITALS
HEIGHT: 61 IN | SYSTOLIC BLOOD PRESSURE: 98 MMHG | BODY MASS INDEX: 24.81 KG/M2 | OXYGEN SATURATION: 98 % | WEIGHT: 131.4 LBS | TEMPERATURE: 98 F | DIASTOLIC BLOOD PRESSURE: 74 MMHG | HEART RATE: 71 BPM

## 2025-04-04 DIAGNOSIS — F32.A DEPRESSIVE DISORDER: ICD-10-CM

## 2025-04-04 DIAGNOSIS — I10 PRIMARY HYPERTENSION: ICD-10-CM

## 2025-04-04 DIAGNOSIS — F51.01 PRIMARY INSOMNIA: ICD-10-CM

## 2025-04-04 DIAGNOSIS — G47.00 INSOMNIA, UNSPECIFIED TYPE: ICD-10-CM

## 2025-04-04 DIAGNOSIS — M54.41 CHRONIC MIDLINE LOW BACK PAIN WITH BILATERAL SCIATICA: ICD-10-CM

## 2025-04-04 DIAGNOSIS — R06.02 SOB (SHORTNESS OF BREATH): ICD-10-CM

## 2025-04-04 DIAGNOSIS — F03.A0 MILD DEMENTIA WITHOUT BEHAVIORAL DISTURBANCE, PSYCHOTIC DISTURBANCE, MOOD DISTURBANCE, OR ANXIETY, UNSPECIFIED DEMENTIA TYPE (HCC): Primary | ICD-10-CM

## 2025-04-04 DIAGNOSIS — G89.29 CHRONIC MIDLINE LOW BACK PAIN WITH BILATERAL SCIATICA: ICD-10-CM

## 2025-04-04 DIAGNOSIS — M54.42 CHRONIC MIDLINE LOW BACK PAIN WITH BILATERAL SCIATICA: ICD-10-CM

## 2025-04-04 PROCEDURE — 99205 OFFICE O/P NEW HI 60 MIN: CPT

## 2025-04-04 RX ORDER — ESCITALOPRAM OXALATE 10 MG/1
20 TABLET ORAL DAILY
Qty: 60 TABLET | Refills: 5 | Status: SHIPPED | OUTPATIENT
Start: 2025-04-04

## 2025-04-04 NOTE — ASSESSMENT & PLAN NOTE
History of known dementia, unclear on severity prior to evaluation. After evaluation, diagnosis is consistent with mild stages of Alzheimer's Dementia.   Staging: Mild Stage Dementia  Decision-making capacity: May have limitations in complex medical or financial decisions.  Consider neuropsychology evaluation for further determination  Caregiver Review: No needs at this time, did educate if needs arise, SW can help with any increasing needs within the home and for further options for care.   Safety:  Medication Review: Medications reviewed and appropriate for chronic conditions.   Driving: Not driving.  Fall Risk: Medium fall risk  ACP Review: Educated the need for POA and Living Will due to ongoing memory concerns.   Will order labs.   Plan to follow up at scheduled care conference.

## 2025-04-04 NOTE — ASSESSMENT & PLAN NOTE
Patient reports recent viral illness, with residual SOB and slight chest pain with inspiration.   Lungs are clear, no abnormal breath sounds.   Due to pain, will order CXR for evaluation.

## 2025-04-08 ENCOUNTER — APPOINTMENT (OUTPATIENT)
Dept: LAB | Facility: HOSPITAL | Age: 75
End: 2025-04-08
Payer: COMMERCIAL

## 2025-04-08 ENCOUNTER — HOSPITAL ENCOUNTER (OUTPATIENT)
Dept: RADIOLOGY | Facility: HOSPITAL | Age: 75
Discharge: HOME | End: 2025-04-08
Payer: COMMERCIAL

## 2025-04-08 DIAGNOSIS — F03.A0 MILD DEMENTIA WITHOUT BEHAVIORAL DISTURBANCE, PSYCHOTIC DISTURBANCE, MOOD DISTURBANCE, OR ANXIETY, UNSPECIFIED DEMENTIA TYPE (HCC): ICD-10-CM

## 2025-04-08 DIAGNOSIS — R06.02 SOB (SHORTNESS OF BREATH): ICD-10-CM

## 2025-04-08 LAB
CREAT UR-MCNC: 88.5 MG/DL
FOLATE SERPL-MCNC: 7.6 NG/ML
MICROALBUMIN UR-MCNC: 8.3 MG/L
MICROALBUMIN/CREAT 24H UR: 9 MG/G CREATININE (ref 0–30)
TSH SERPL DL<=0.05 MIU/L-ACNC: 1.05 UIU/ML (ref 0.45–4.5)
VIT B12 SERPL-MCNC: 142 PG/ML (ref 180–914)

## 2025-04-08 PROCEDURE — 71046 X-RAY EXAM CHEST 2 VIEWS: CPT

## 2025-04-08 PROCEDURE — 36415 COLL VENOUS BLD VENIPUNCTURE: CPT

## 2025-04-08 PROCEDURE — 82746 ASSAY OF FOLIC ACID SERUM: CPT

## 2025-04-08 PROCEDURE — 84443 ASSAY THYROID STIM HORMONE: CPT

## 2025-04-08 PROCEDURE — 82607 VITAMIN B-12: CPT

## 2025-06-13 ENCOUNTER — OFFICE VISIT (OUTPATIENT)
Dept: FAMILY MEDICINE CLINIC | Facility: CLINIC | Age: 75
End: 2025-06-13

## 2025-06-13 VITALS
WEIGHT: 159 LBS | HEIGHT: 61 IN | DIASTOLIC BLOOD PRESSURE: 76 MMHG | TEMPERATURE: 97.9 F | SYSTOLIC BLOOD PRESSURE: 122 MMHG | BODY MASS INDEX: 30.02 KG/M2 | RESPIRATION RATE: 18 BRPM | HEART RATE: 68 BPM | OXYGEN SATURATION: 97 %

## 2025-06-13 DIAGNOSIS — G47.00 INSOMNIA, UNSPECIFIED TYPE: Primary | ICD-10-CM

## 2025-06-13 DIAGNOSIS — B37.31 VAGINAL YEAST INFECTION: ICD-10-CM

## 2025-06-13 DIAGNOSIS — Z91.09 ENVIRONMENTAL ALLERGIES: ICD-10-CM

## 2025-06-13 DIAGNOSIS — E53.8 VITAMIN B12 DEFICIENCY: ICD-10-CM

## 2025-06-13 DIAGNOSIS — F32.A DEPRESSIVE DISORDER: ICD-10-CM

## 2025-06-13 PROCEDURE — 3074F SYST BP LT 130 MM HG: CPT | Performed by: FAMILY MEDICINE

## 2025-06-13 PROCEDURE — 99214 OFFICE O/P EST MOD 30 MIN: CPT | Performed by: FAMILY MEDICINE

## 2025-06-13 PROCEDURE — 3078F DIAST BP <80 MM HG: CPT | Performed by: FAMILY MEDICINE

## 2025-06-13 RX ORDER — FLUCONAZOLE 150 MG/1
150 TABLET ORAL ONCE
Qty: 1 TABLET | Refills: 0 | Status: SHIPPED | OUTPATIENT
Start: 2025-06-13 | End: 2025-06-13

## 2025-06-13 RX ORDER — TRAZODONE HYDROCHLORIDE 50 MG/1
50 TABLET ORAL
Qty: 30 TABLET | Refills: 2 | Status: SHIPPED | OUTPATIENT
Start: 2025-06-13

## 2025-06-13 RX ORDER — FEXOFENADINE HCL 60 MG/1
60 TABLET, FILM COATED ORAL DAILY
Qty: 30 TABLET | Refills: 5 | Status: SHIPPED | OUTPATIENT
Start: 2025-06-13

## 2025-06-13 RX ORDER — ESCITALOPRAM OXALATE 20 MG/1
20 TABLET ORAL DAILY
Qty: 30 TABLET | Refills: 5 | Status: SHIPPED | OUTPATIENT
Start: 2025-06-13

## 2025-06-13 NOTE — ASSESSMENT & PLAN NOTE
History of dementia, depression, anxiety, chronic pain and insomnia  Symptoms stable  Plan:  Continue Trazodone 50 mg at night and Lexapro 10 mg in the evening  Geriatrics referral pending    Orders:  •  traZODone (DESYREL) 50 mg tablet; Take 1 tablet (50 mg total) by mouth daily at bedtime  •  escitalopram (Lexapro) 20 mg tablet; Take 1 tablet (20 mg total) by mouth daily

## 2025-06-13 NOTE — PROGRESS NOTES
Name: Lucie Ramirez      : 1950      MRN: [id number]  Encounter Provider: Sophie Juan MD  Encounter Date: 2025   Encounter department: [facility]  :  Assessment & Plan  Insomnia, unspecified type  History of dementia, depression, anxiety, chronic pain and insomnia  Symptoms stable  Plan:  Continue Trazodone 50 mg at night and Lexapro 10 mg in the evening  Geriatrics referral pending    Orders:  •  traZODone (DESYREL) 50 mg tablet; Take 1 tablet (50 mg total) by mouth daily at bedtime  •  escitalopram (Lexapro) 20 mg tablet; Take 1 tablet (20 mg total) by mouth daily    Depressive disorder  Symptoms of depression, anxiety have improved  No SI/HI ideation  Discontinue Mirtazapine 15 mg  Plan:  Continue Trazodone 50 mg at night and Lexapro 10 mg in the evening  Orders:  •  traZODone (DESYREL) 50 mg tablet; Take 1 tablet (50 mg total) by mouth daily at bedtime  •  escitalopram (Lexapro) 20 mg tablet; Take 1 tablet (20 mg total) by mouth daily    Vitamin B12 deficiency  Start Vitamin B12 supplements daily  Orders:  •  cyanocobalamin (VITAMIN B-12) 1000 MCG tablet; Take 1 tablet (1,000 mcg total) by mouth daily  •  Vitamin B12; Future    Vaginal yeast infection  Fluconazole 150 mg   Orders:  •  fluconazole (DIFLUCAN) 150 mg tablet; Take 1 tablet (150 mg total) by mouth once for 1 dose    Environmental allergies    Orders:  •  fexofenadine (ALLEGRA) 60 MG tablet; Take 1 tablet (60 mg total) by mouth daily           History of Present Illness   HPI    Lucie Ramirez is a 75 y.o. female  who presented to the office today to follow up for anxiety and depression.  Pt visited her family for 3 weeks in [place].  She is feeling well today.  She states has been feeling less anxiety and depressive symptoms more recently.  She has two different caregivers coming during the week and she has someone to talk to.  + vaginal itching and discharge  + weight gain    Review of  "Systems   Constitutional:  Negative for fever.   HENT:  Negative for congestion.    Respiratory:  Negative for shortness of breath.    Cardiovascular:  Negative for chest pain.   Musculoskeletal:  Positive for arthralgias and back pain.   Allergic/Immunologic: Positive for environmental allergies.   Neurological:  Negative for headaches.   Psychiatric/Behavioral:  Positive for sleep disturbance. Negative for self-injury. The patient is not nervous/anxious.        Objective   /76 (BP Location: Right arm, Patient Position: Sitting, Cuff Size: Standard)   Pulse 68   Temp 97.9 °F (36.6 °C) (Temporal)   Resp 18   Ht 5' 1\" (1.549 m)   Wt 72.1 kg (159 lb)   SpO2 97%   BMI 30.04 kg/m²      Physical Exam  Vitals and nursing note reviewed.   Constitutional:       General: She is not in acute distress.     Appearance: She is well-developed.   HENT:      Head: Normocephalic and atraumatic.      Mouth/Throat:      Mouth: Mucous membranes are moist.     Eyes:      Conjunctiva/sclera: Conjunctivae normal.       Cardiovascular:      Rate and Rhythm: Normal rate and regular rhythm.   Pulmonary:      Effort: Pulmonary effort is normal. No respiratory distress.   Abdominal:      Palpations: Abdomen is soft.      Tenderness: There is no abdominal tenderness.     Musculoskeletal:         General: No swelling.      Cervical back: Neck supple.     Skin:     General: Skin is warm and dry.      Capillary Refill: Capillary refill takes less than 2 seconds.     Neurological:      Mental Status: She is alert and oriented to person, place, and time.     Psychiatric:         Attention and Perception: Attention normal.         Mood and Affect: Mood normal.         Speech: Speech normal.         Behavior: Behavior normal. Behavior is cooperative.         Thought Content: Thought content does not include homicidal or suicidal ideation.         "

## 2025-06-13 NOTE — ASSESSMENT & PLAN NOTE
Symptoms of depression, anxiety have improved  No SI/HI ideation  Discontinue Mirtazapine 15 mg  Plan:  Continue Trazodone 50 mg at night and Lexapro 10 mg in the evening  Orders:  •  traZODone (DESYREL) 50 mg tablet; Take 1 tablet (50 mg total) by mouth daily at bedtime  •  escitalopram (Lexapro) 20 mg tablet; Take 1 tablet (20 mg total) by mouth daily

## 2025-06-16 NOTE — PROGRESS NOTES
[facility]  [address], [address]  [place] [address]  [phone]    Care Conference    NAME: Lucie Ramirez AGE: 75 y.o. SEX: female  YOB: 1950  DATE OF ASSESSMENT: 04/04/2025  DATE OF CONFERENCE: 06/23/2025    Family Present: her home care nurseRuma   Staff Present: AG Garrido    Patient / Family Goals of Care: Review cognitive decline and associated symptoms, discuss treatment options and care planning.     Medical Conditions (Current/Historical): SOB (shortness of breath), Primary hypertension, Depressive disorder, Primary insomnia, Chronic midline low back pain with bilateral sciatica, Insomnia, unspecified type    Neuropsychological  Staging: Mild stage dementia  [place] Cognitive Assessment: 18/30, deficits in: attention, language, and delayed recall   Geriatric Depression Screen: 10/15    Decision-making capacity: May have limitations in complex medical or financial decisions. Consider neuropsychology evaluation for further determination   Caregiver Review: No needs at this time, did educate if needs arise, SW is available.    Safety:  Medication review: Medications reviewed and appropriate for chronic conditions.   Driving Safety: Not driving  Fall Risk: Medium fall risk  ACP review: Educated the need for POA and Living Will due to ongoing memory concerns.     Remain active physically, mentally and socially  Pharmaceutical and non-pharmaceutical interventions discussed  Engage in cognitively challenging exercises such as crosswords and puzzles  Maintain chronic conditions under control  Repeat cognitive assessment in 6 months      Diagnostic Studies  Review of blood work   TSH: 1.055  Vitamin B12: 142 - would recommend continuing on B12 1,000 mcg daily   Folate: 7.6    Functional Status   Activities of Daily Living: Some assist   Instrumental Activities of Daily Living: Some assist    Encourage appropriate footwear at all  times  Review fall risk prevention tips and adjust within the home environment as needed    Medications Reviewed   Check with PCP before using over the counter medications  Avoid over the counter medications that can affect cognition (e.g., Benadryl, Tylenol PM)   Avoid NSAIDs due to risk of GI bleed and renal impairment    Other Findings   Overall health  BMI: 30.04 kg/m2  Maintain well-balanced diet  Continue following with primary care physician regularly  Primary hypertension  Stable.   Continues on Atenolol 100 mg daily for BP management.   Denies any dizziness or headaches.   Continue to follow up with PCP for BP management.    Depressive disorder  Patient is very pleasant today in office.   Does report feeling very depressed due to her pain, and issues with sleeping at times.   Continues on Lexapro, Trazodone for mood stabilization.  Denies SI.   Patient deferred need for talk therapy at this time.   Encouraged stress relief techniques, relaxation, and emotional support.  Primary insomnia  Patient reports not sleeping well at nighttime, but has been improving.   Continues on Trazodone for anxiety and insomnia.   Encouraged good sleep hygiene, avoid daytime napping, avoiding OTC medications that may cause oversedation/increased confusion.   Chronic midline low back pain with bilateral sciatica  Endorses some pain and discomfort in the lower back.   Patient reports ambulating without any assistive devices at this time.   Denies any recent falls.   PT/OT deferred at this time.   Continues Tylenol, Voltaren for pain relief.   Plan to continue to follow with pain management for further recommendations. Can order PT/OT services in the future if needed.   Insomnia, unspecified type  Patient reports not sleeping well at nighttime, but has been improving.   Continues on Trazodone for anxiety and insomnia.   Encouraged good sleep hygiene, avoid daytime napping, avoiding OTC medications that may cause  oversedation/increased confusion.      Social /safety recommendations & considerations  Consider an Adult Day Program for positive socialization, physical exercise, cognitive stimulation and family respite  Consider a home care aide to assist with daily care needs  Stay in touch with family and friends  Recommend review of fall risk prevention tips  Recommend use of fall precautions including fall alert device  Scam safety: do not answer suspicious mail, phone calls, email or text messages without having another person review for safety  Consider assistance for medication administration such as blister packaging or use of an automated pill dispenser  Recommend contacting your local [facility] Agency on Aging for possible eligible programs such as OPTIONS, Caregiver Support Program, or WAIVER    Long term care recommendations  Maintain updated advance directives and provide a copy to your primary care provider  Consider caregiver support groups and educational resources through the Alzheimer's Association; access Alzheimer's Association 24/7 Helpline at 1-528.718.8548  [facility] does offer a monthly caregiver support group. If interested, please speak with a .  Utilize reorientation and redirection as needed (dependent on situation)  Review educational information provided  Recommended literature: 36 Hour Day, Untangling Alzheimer's, Learning to Speak Alzheimer's    Patient and family verbalized understanding of above care plan and in agreement with plan. For care coordination purposes, this care plan will be shared with your primary care provider. With any questions, please contact our office at [phone].

## 2025-06-23 ENCOUNTER — OFFICE VISIT (OUTPATIENT)
Age: 75
End: 2025-06-23
Payer: COMMERCIAL

## 2025-06-23 VITALS
WEIGHT: 160 LBS | BODY MASS INDEX: 30.21 KG/M2 | HEART RATE: 66 BPM | TEMPERATURE: 97.3 F | HEIGHT: 61 IN | OXYGEN SATURATION: 97 % | DIASTOLIC BLOOD PRESSURE: 50 MMHG | SYSTOLIC BLOOD PRESSURE: 122 MMHG

## 2025-06-23 DIAGNOSIS — F32.A DEPRESSIVE DISORDER: ICD-10-CM

## 2025-06-23 DIAGNOSIS — F02.A0 MILD LATE ONSET ALZHEIMER'S DEMENTIA WITHOUT BEHAVIORAL DISTURBANCE, PSYCHOTIC DISTURBANCE, MOOD DISTURBANCE, OR ANXIETY (HCC): Primary | ICD-10-CM

## 2025-06-23 DIAGNOSIS — G30.1 MILD LATE ONSET ALZHEIMER'S DEMENTIA WITHOUT BEHAVIORAL DISTURBANCE, PSYCHOTIC DISTURBANCE, MOOD DISTURBANCE, OR ANXIETY (HCC): Primary | ICD-10-CM

## 2025-06-23 DIAGNOSIS — Z85.3 PERSONAL HISTORY OF BREAST CANCER: ICD-10-CM

## 2025-06-23 DIAGNOSIS — G47.00 INSOMNIA, UNSPECIFIED TYPE: ICD-10-CM

## 2025-06-23 DIAGNOSIS — N64.4 BREAST PAIN, RIGHT: Primary | ICD-10-CM

## 2025-06-23 PROCEDURE — 99483 ASSMT & CARE PLN PT COG IMP: CPT

## 2025-06-23 RX ORDER — TRAZODONE HYDROCHLORIDE 100 MG/1
100 TABLET ORAL
Qty: 90 TABLET | Refills: 0 | Status: SHIPPED | OUTPATIENT
Start: 2025-06-23

## 2025-06-23 RX ORDER — TRAZODONE HYDROCHLORIDE 50 MG/1
100 TABLET ORAL
Qty: 60 TABLET | Refills: 2 | Status: SHIPPED | OUTPATIENT
Start: 2025-06-23 | End: 2025-06-23

## 2025-06-23 NOTE — PROGRESS NOTES
[facility]  [address], [address]  [place] [address] [phone]    Care Conference: Resources Provided    MSW provided the following resources, along with a copy of the care plan:  [address]   [address]  [place] [address]    General Information  - 10 Ways to Love Your Brain  - Memory loss ladder  - Packet with Alzheimer's Association information including: definition of dementia, communication tips for different stages, common behaviors and response strategies    Caregiver Support  - Flyer for [facility] Virtual Caregiver Support Group (meeting 2x per month by Zoom)  - Alzheimer's Association Rx Pad (guide to website and 24/7 helpline, 1-965.200.2159)    Safety  - CDC fall prevention / home safety checklist    Community Resources  - [facility] Resource Guide (contains information about higher levels of care, homecare, etc.)  - [facility]: Dementia Resource Guide & Brochure

## 2025-06-30 ENCOUNTER — TELEPHONE (OUTPATIENT)
Age: 75
End: 2025-06-30

## 2025-07-31 ENCOUNTER — TELEPHONE (OUTPATIENT)
Dept: FAMILY MEDICINE CLINIC | Facility: CLINIC | Age: 75
End: 2025-07-31

## 2025-07-31 PROBLEM — R32 URINARY INCONTINENCE: Status: ACTIVE | Noted: 2025-07-31

## 2025-08-12 ENCOUNTER — HOSPITAL ENCOUNTER (OUTPATIENT)
Dept: ULTRASOUND IMAGING | Facility: CLINIC | Age: 75
Discharge: HOME | End: 2025-08-12
Attending: FAMILY MEDICINE
Payer: COMMERCIAL

## 2025-08-12 ENCOUNTER — HOSPITAL ENCOUNTER (OUTPATIENT)
Dept: MAMMOGRAPHY | Facility: CLINIC | Age: 75
Discharge: HOME | End: 2025-08-12
Attending: FAMILY MEDICINE
Payer: COMMERCIAL